# Patient Record
Sex: MALE | Race: WHITE | NOT HISPANIC OR LATINO | Employment: OTHER | URBAN - NONMETROPOLITAN AREA
[De-identification: names, ages, dates, MRNs, and addresses within clinical notes are randomized per-mention and may not be internally consistent; named-entity substitution may affect disease eponyms.]

---

## 2017-07-31 ENCOUNTER — OFFICE VISIT (OUTPATIENT)
Dept: FAMILY MEDICINE | Facility: OTHER | Age: 47
End: 2017-07-31

## 2017-07-31 ENCOUNTER — RADIANT APPOINTMENT (OUTPATIENT)
Dept: GENERAL RADIOLOGY | Facility: OTHER | Age: 47
End: 2017-07-31
Attending: PHYSICIAN ASSISTANT

## 2017-07-31 VITALS
HEIGHT: 71 IN | TEMPERATURE: 97.4 F | SYSTOLIC BLOOD PRESSURE: 110 MMHG | RESPIRATION RATE: 16 BRPM | DIASTOLIC BLOOD PRESSURE: 72 MMHG | HEART RATE: 72 BPM | WEIGHT: 193.2 LBS | BODY MASS INDEX: 27.05 KG/M2

## 2017-07-31 DIAGNOSIS — R10.2 PELVIC PAIN IN MALE: Primary | ICD-10-CM

## 2017-07-31 DIAGNOSIS — E78.5 HYPERLIPIDEMIA LDL GOAL <130: ICD-10-CM

## 2017-07-31 DIAGNOSIS — R10.2 PELVIC PAIN IN MALE: ICD-10-CM

## 2017-07-31 PROCEDURE — 99213 OFFICE O/P EST LOW 20 MIN: CPT | Performed by: PHYSICIAN ASSISTANT

## 2017-07-31 PROCEDURE — 72170 X-RAY EXAM OF PELVIS: CPT

## 2017-07-31 ASSESSMENT — PAIN SCALES - GENERAL: PAINLEVEL: EXTREME PAIN (8)

## 2017-07-31 NOTE — PROGRESS NOTES
SUBJECTIVE:                                                    Eric Wilson is a 46 year old male who presents to clinic today for the following health issues:    Patient has no insurance.    Joint Pain    Onset: yesterday was struck to the left ischium (by his description) by a saddle horn while riding horse at a [place].    Description:   Location: pelvic pain  Character: Sharp    Intensity: severe    Progression of Symptoms: same    Accompanying Signs & Symptoms:  Other symptoms: none    History:   Previous similar pain: no       Precipitating factors:   Trauma or overuse: YES    Alleviating factors:  Improved by: rest/inactivity and crutches    Therapies Tried and outcome: crutches, ibuprofen; slight relief    Problem list and histories reviewed & adjusted, as indicated.  Additional history: as documented    Patient Active Problem List   Diagnosis     Contusion of hand     Closed fracture of hamate (unciform) bone of wrist     Foreign body in site on external eye     Atopic rhinitis     CARDIOVASCULAR SCREENING; LDL GOAL LESS THAN 160     Past Surgical History:   Procedure Laterality Date     HC OPEN RX CARPAL BONE FX,EACH BONE  01/24/07    Open reduction internal fixation, right hand     NO HISTORY OF SURGERY         Social History   Substance Use Topics     Smoking status: Former Smoker     Packs/day: 0.50     Smokeless tobacco: Current User     Types: Chew      Comment: quit in 2000     Alcohol use Yes      Comment: rare     Family History   Problem Relation Age of Onset     DIABETES Maternal Grandfather      Prostate Cancer Paternal Grandfather              Reviewed and updated as needed this visit by clinical staffTobacco  Allergies  Meds  Med Hx  Surg Hx  Fam Hx  Soc Hx      Reviewed and updated as needed this visit by Provider         ROS:  Constitutional, HEENT, cardiovascular, pulmonary, gi and gu systems are negative, except as otherwise noted.      OBJECTIVE:   /72 (BP Location: Left arm,  "Patient Position: Chair, Cuff Size: Adult Large)  Pulse 72  Temp 97.4  F (36.3  C) (Oral)  Resp 16  Ht 5' 11\" (1.803 m)  Wt 193 lb 3.2 oz (87.6 kg)  BMI 26.95 kg/m2  Body mass index is 26.95 kg/(m^2).  GENERAL: healthy, alert and no distress  MS: decreased range of motion due to pain making full exam difficult today.  Pain to the bilateral lower buttocks noted as well as the pelvic floor.  NEURO: Normal strength and tone, mentation intact and speech normal  PSYCH: mentation appears normal, affect normal/bright    Diagnostic Test Results:  X-ray: negative for pathology today to my eye.  It will be overread by radiology.      ASSESSMENT/PLAN:     1. Pelvic pain in male  Suspected traumatic muscle strain / sprain.  Ice / heat as needed, frequent ambulation as tolerated.  ROV 1 week if not improved for consideration of MRI.  - XR Pelvis 1/2 Views; Future    Wayne Hayes PA-C  [facility]    "

## 2017-07-31 NOTE — NURSING NOTE
"Chief Complaint   Patient presents with     Pelvic Pain     yesterday       Initial /72 (BP Location: Left arm, Patient Position: Chair, Cuff Size: Adult Large)  Pulse 72  Temp 97.4  F (36.3  C) (Oral)  Resp 16  Ht 5' 11\" (1.803 m)  Wt 193 lb 3.2 oz (87.6 kg)  BMI 26.95 kg/m2 Estimated body mass index is 26.95 kg/(m^2) as calculated from the following:    Height as of this encounter: 5' 11\" (1.803 m).    Weight as of this encounter: 193 lb 3.2 oz (87.6 kg).  Medication Reconciliation: complete     Lindsey GUDINO LPN      "

## 2017-07-31 NOTE — MR AVS SNAPSHOT
"              After Visit Summary   2017    Eric Wilson    MRN: [id number]           Patient Information     Date Of Birth          1970        Visit Information        Provider Department      2017 10:40 AM Wayne Sauceda PA-C [facility]        Today's Diagnoses     Pelvic pain in male    -  1    Hyperlipidemia LDL goal <130           Follow-ups after your visit        Who to contact     If you have questions or need follow up information about today's clinic visit or your schedule please contact [facility] directly at [phone].  Normal or non-critical lab and imaging results will be communicated to you by [employer], letter or phone within 4 business days after the clinic has received the results. If you do not hear from us within 7 days, please contact the clinic through [employer] or phone. If you have a critical or abnormal lab result, we will notify you by phone as soon as possible.  Submit refill requests through [employer] or call your pharmacy and they will forward the refill request to us. Please allow 3 business days for your refill to be completed.          Additional Information About Your Visit        MyChart Information     [employer] lets you send messages to your doctor, view your test results, renew your prescriptions, schedule appointments and more. To sign up, go to [email or web address] . Click on \"Log in\" on the left side of the screen, which will take you to the Welcome page. Then click on \"Sign up Now\" on the right side of the page.     You will be asked to enter the access code listed below, as well as some personal information. Please follow the directions to create your username and password.     Your access code is: [email or web address]  Expires: 10/29/2017 11:18 AM     Your access code will  in 90 days. If you need help or a new code, please call your [facility] or [phone].        Care EveryWhere ID     This is your Care EveryWhere ID. This " "could be used by other organizations to access your [place] medical records  [id number]        Your Vitals Were     Pulse Temperature Respirations Height BMI (Body Mass Index)       72 97.4  F (36.3  C) (Oral) 16 5' 11\" (1.803 m) 26.95 kg/m2        Blood Pressure from Last 3 Encounters:   07/31/17 110/72   11/23/12 133/85   11/12/10 141/83    Weight from Last 3 Encounters:   07/31/17 193 lb 3.2 oz (87.6 kg)   11/12/10 176 lb 4 oz (79.9 kg)   08/10/10 174 lb (78.9 kg)               Primary Care Provider Office Phone # Fax #    Sanjiv Carrasco [phone]       [place] [id number] [place] [address]        Equal Access to Services     [facility]: Hadii thad ruiz hadasho Soomaali, waaxda luqadaha, qaybta kaalmada adeegyada, waxay barbin hayedna ruffin . So [facility] [phone].    ATENCIÓN: Si habla español, tiene a hammer disposición servicios gratuitos de asistencia lingüística. Wale al [phone].    We comply with applicable federal civil rights laws and [place] laws. We do not discriminate on the basis of race, color, national origin, age, disability sex, sexual orientation or gender identity.            Thank you!     Thank you for choosing [facility]  for your care. Our goal is always to provide you with excellent care. Hearing back from our patients is one way we can continue to improve our services. Please take a few minutes to complete the written survey that you may receive in the mail after your visit with us. Thank you!             Your Updated Medication List - Protect others around you: Learn how to safely use, store and throw away your medicines at www.disposemymeds.org.          This list is accurate as of: 7/31/17 11:18 AM.  Always use your most recent med list.                   Brand Name Dispense Instructions for use Diagnosis    loratadine-pseudoePHEDrine  MG per 24 hr tablet    CLARITIN-D 24-hour    30 tablet    Take " daily PRN for allergies    Atopic rhinitis

## 2018-12-27 ENCOUNTER — OFFICE VISIT (OUTPATIENT)
Dept: FAMILY MEDICINE | Facility: OTHER | Age: 48
End: 2018-12-27
Payer: COMMERCIAL

## 2018-12-27 VITALS
RESPIRATION RATE: 16 BRPM | TEMPERATURE: 99.4 F | WEIGHT: 193 LBS | HEART RATE: 60 BPM | SYSTOLIC BLOOD PRESSURE: 116 MMHG | DIASTOLIC BLOOD PRESSURE: 74 MMHG | BODY MASS INDEX: 26.14 KG/M2 | HEIGHT: 72 IN

## 2018-12-27 DIAGNOSIS — M25.562 ACUTE PAIN OF LEFT KNEE: Primary | ICD-10-CM

## 2018-12-27 PROCEDURE — 99213 OFFICE O/P EST LOW 20 MIN: CPT | Performed by: PHYSICIAN ASSISTANT

## 2018-12-27 ASSESSMENT — PAIN SCALES - GENERAL: PAINLEVEL: NO PAIN (0)

## 2018-12-27 ASSESSMENT — MIFFLIN-ST. JEOR: SCORE: 1798.44

## 2018-12-27 NOTE — PROGRESS NOTES
SUBJECTIVE:   Eric Wilson is a 47 year old male who presents to clinic today for the following health issues:    Left knee pain  Patient presents today for evaluation of left knee pain. He states she was kneeling down on the carpet last night fixing the TV when he had sudden, sharp pain over the left knee cap. He reports there continues to be pain in the same area but only when touched. He reports dog even brushed by his knee last night and that gave him a sharp pain. No other acute injury. No change in activity. He denies redness, swelling or discoloration. Does not feel like knee is going to give out. He denies trouble with knee pain in the past. He did fracture his tib/fib on this side many years ago but no ongoing symptoms. Patient works as a . Does feel knees bent most of the time. No fevers or other joint pain.     Problem list and histories reviewed & adjusted, as indicated.  Additional history: as documented    Patient Active Problem List   Diagnosis     Hyperlipidemia LDL goal <130     Pelvic pain in male     Past Surgical History:   Procedure Laterality Date     HC OPEN RX CARPAL BONE FX,EACH BONE  01/24/07    Open reduction internal fixation, right hand     NO HISTORY OF SURGERY         Social History     Tobacco Use     Smoking status: Former Smoker     Packs/day: 0.50     Smokeless tobacco: Current User     Types: Chew     Tobacco comment: quit in 2000   Substance Use Topics     Alcohol use: Yes     Comment: rare     Family History   Problem Relation Age of Onset     Diabetes Maternal Grandfather      Prostate Cancer Paternal Grandfather          Current Outpatient Medications   Medication Sig Dispense Refill     loratadine-pseudoePHEDrine (CLARITIN-D 24-HOUR)  MG per tablet Take daily PRN for allergies 30 tablet 1     Allergies   Allergen Reactions     Aspirin      Penicillins      BP Readings from Last 3 Encounters:   12/27/18 116/74   07/31/17 110/72   11/23/12 133/85    Wt  Readings from Last 3 Encounters:   12/27/18 87.5 kg (193 lb)   07/31/17 87.6 kg (193 lb 3.2 oz)   11/12/10 79.9 kg (176 lb 4 oz)         Reviewed and updated as needed this visit by clinical staff  Tobacco  Allergies  Meds  Med Hx  Surg Hx  Fam Hx  Soc Hx      Reviewed and updated as needed this visit by Provider       ROS:  Constitutional, HEENT, cardiovascular, pulmonary, gi and gu systems are negative, except as otherwise noted.    OBJECTIVE:     /74   Pulse 60   Temp 99.4  F (37.4  C) (Temporal)   Resp 16   Ht 1.829 m (6')   Wt 87.5 kg (193 lb)   BMI 26.18 kg/m    Body mass index is 26.18 kg/m .  GENERAL: healthy, alert and no distress  MS: left knee with no obvious deformity. No erythema, edema or ecchymosis. Tenderness over anterior patella. Mild crepitus appreciated. Full ROM of knee without pain. No joint line tenderness. Ligaments intact.   SKIN: no suspicious lesions or rashes  PSYCH: mentation appears normal, affect normal/bright    Diagnostic Test Results:  none     ASSESSMENT/PLAN:     1. Acute pain of left knee  Suspect symptoms likely related to mild bursitis. Encouraged supportive cares with rest, elevation, ice and tylenol/ibuprofen as needed. Discussed limiting time on knees and to extend leg when driving rather than keeping bent more than 90 degrees. Discussed signs of infection to be aware of. Patient will call if these develop. Follow-up pending progression of symptoms.     The patient indicates understanding of these issues and agrees with the plan.    Nette Lowery PA-C  [facility]

## 2019-04-28 ENCOUNTER — HOSPITAL ENCOUNTER (EMERGENCY)
Facility: CLINIC | Age: 49
Discharge: HOME | End: 2019-04-28
Attending: EMERGENCY MEDICINE | Admitting: EMERGENCY MEDICINE
Payer: COMMERCIAL

## 2019-04-28 VITALS
TEMPERATURE: 98.5 F | OXYGEN SATURATION: 96 % | HEIGHT: 72 IN | SYSTOLIC BLOOD PRESSURE: 145 MMHG | WEIGHT: 194 LBS | DIASTOLIC BLOOD PRESSURE: 82 MMHG | HEART RATE: 88 BPM | BODY MASS INDEX: 26.28 KG/M2 | RESPIRATION RATE: 18 BRPM

## 2019-04-28 DIAGNOSIS — S01.81XA CHIN LACERATION, INITIAL ENCOUNTER: ICD-10-CM

## 2019-04-28 PROCEDURE — 12011 RPR F/E/E/N/L/M 2.5 CM/<: CPT | Performed by: EMERGENCY MEDICINE

## 2019-04-28 PROCEDURE — 99283 EMERGENCY DEPT VISIT LOW MDM: CPT | Mod: 25 | Performed by: EMERGENCY MEDICINE

## 2019-04-28 PROCEDURE — 90471 IMMUNIZATION ADMIN: CPT | Performed by: EMERGENCY MEDICINE

## 2019-04-28 PROCEDURE — 90715 TDAP VACCINE 7 YRS/> IM: CPT | Performed by: EMERGENCY MEDICINE

## 2019-04-28 PROCEDURE — 12011 RPR F/E/E/N/L/M 2.5 CM/<: CPT | Mod: Z6 | Performed by: EMERGENCY MEDICINE

## 2019-04-28 PROCEDURE — 25000128 H RX IP 250 OP 636: Performed by: EMERGENCY MEDICINE

## 2019-04-28 PROCEDURE — 25000125 ZZHC RX 250: Performed by: EMERGENCY MEDICINE

## 2019-04-28 RX ORDER — LIDOCAINE HYDROCHLORIDE AND EPINEPHRINE 10; 10 MG/ML; UG/ML
1 INJECTION, SOLUTION INFILTRATION; PERINEURAL ONCE
Status: COMPLETED | OUTPATIENT
Start: 2019-04-28 | End: 2019-04-28

## 2019-04-28 RX ADMIN — LIDOCAINE HYDROCHLORIDE,EPINEPHRINE BITARTRATE 1 ML: 10; .01 INJECTION, SOLUTION INFILTRATION; PERINEURAL at 17:45

## 2019-04-28 RX ADMIN — CLOSTRIDIUM TETANI TOXOID ANTIGEN (FORMALDEHYDE INACTIVATED), CORYNEBACTERIUM DIPHTHERIAE TOXOID ANTIGEN (FORMALDEHYDE INACTIVATED), BORDETELLA PERTUSSIS TOXOID ANTIGEN (GLUTARALDEHYDE INACTIVATED), BORDETELLA PERTUSSIS FILAMENTOUS HEMAGGLUTININ ANTIGEN (FORMALDEHYDE INACTIVATED), BORDETELLA PERTUSSIS PERTACTIN ANTIGEN, AND BORDETELLA PERTUSSIS FIMBRIAE 2/3 ANTIGEN 0.5 ML: 5; 2; 2.5; 5; 3; 5 INJECTION, SUSPENSION INTRAMUSCULAR at 18:07

## 2019-04-28 ASSESSMENT — MIFFLIN-ST. JEOR: SCORE: 1797.98

## 2019-04-28 NOTE — ED PROVIDER NOTES
History     Chief Complaint   Patient presents with     Facial Laceration     HPI  Eric Wilson is a 48 year old male who presents with a chin laceration.  He reports falling going up the stairs about 3 hours ago.  No dental or other injury.  Tetanus unknown.    Allergies:  Allergies   Allergen Reactions     Aspirin      Penicillins        Problem List:    Patient Active Problem List    Diagnosis Date Noted     Hyperlipidemia LDL goal <130 07/31/2017     Priority: Medium     Pelvic pain in male 07/31/2017     Priority: Medium        Past Medical History:    Past Medical History:   Diagnosis Date     Atopic rhinitis 10/22/2009     Closed fracture of hamate (unciform) bone of wrist 1/19/2007     Contusion of hand 1/1/2007     Foreign body in site on external eye 4/21/2007     NO ACTIVE PROBLEMS        Past Surgical History:    Past Surgical History:   Procedure Laterality Date     HC OPEN RX CARPAL BONE FX,EACH BONE  01/24/07    Open reduction internal fixation, right hand     NO HISTORY OF SURGERY         Family History:    Family History   Problem Relation Age of Onset     Diabetes Maternal Grandfather      Prostate Cancer Paternal Grandfather        Social History:  Marital Status:   [2]  Social History     Tobacco Use     Smoking status: Former Smoker     Packs/day: 0.50     Smokeless tobacco: Current User     Types: Chew     Tobacco comment: quit in 2000   Substance Use Topics     Alcohol use: Yes     Comment: rare     Drug use: No        Medications:      loratadine-pseudoePHEDrine (CLARITIN-D 24-HOUR)  MG per tablet         Review of Systems  All other systems are reviewed and are negative    Physical Exam   BP: 145/82  Pulse: 88  Temp: 98.5  F (36.9  C)  Resp: 18  Height: 182.9 cm (6')  Weight: 88 kg (194 lb)  SpO2: 96 %      Physical Exam   Constitutional: No distress.   HENT:   Head: Normocephalic and atraumatic.   1.5 cm laceration over the right anterior shin.  This is into the subcutaneous  tissue.  No foreign body seen.  Child moves well.  No dental injury obvious.   Eyes: Conjunctivae are normal. Right eye exhibits no discharge. Left eye exhibits no discharge. No scleral icterus.   Neck: Normal range of motion.   Pulmonary/Chest: Effort normal. No stridor.   Musculoskeletal: Normal range of motion.   Neurological: He is alert.   Normal speech and mentation   Skin: Skin is warm and dry. No rash noted. He is not diaphoretic.   Psychiatric: Judgment normal.   Vitals reviewed.      ED Course        Laceration repair  Date/Time: 4/28/2019 6:02 PM  Performed by: Héctor Payan MD  Authorized by: Héctor Payan MD   Body area: head/neck  Location details: chin  Laceration length: 1.5 cm  Foreign bodies: no foreign bodies  Anesthesia: local infiltration    Anesthesia:  Local Anesthetic: lidocaine 1% with epinephrine  Anesthetic total: 4 mL  Irrigation solution: saline  Amount of cleaning: standard  Skin closure: Ethilon  Number of sutures: 4  Technique: simple  Approximation: close  Approximation difficulty: simple  Patient tolerance: Patient tolerated the procedure well with no immediate complications                     Critical Care time:  none               No results found for this or any previous visit (from the past 24 hour(s)).    Medications   Tdap (tetanus-diphtheria-acell pertussis) (ADACEL) injection 0.5 mL (has no administration in time range)   lidocaine 1% with EPINEPHrine 1:100,000 injection 1 mL (1 mL Intradermal Given 4/28/19 1745)       Assessments & Plan (with Medical Decision Making)  48-year-old male with chin laceration.  Sutured as above.  Tetanus updated.  Sutures out in 5 days.     I have reviewed the nursing notes.    I have reviewed the findings, diagnosis, plan and need for follow up with the patient.          Medication List      There are no discharge medications for this visit.         Final diagnoses:   Chin laceration, initial encounter       4/28/2019   [place]  [facility] EMERGENCY DEPARTMENT     Héctor Payan MD  04/28/19 9718

## 2019-04-28 NOTE — ED AVS SNAPSHOT
[facility] Emergency Department  [address] [place] [address]  Phone:  [phone]  Fax:  [phone]                                    Eric Wilson   MRN: [id number]    Department:  [facility] Emergency Department   Date of Visit:  4/28/2019           After Visit Summary Signature Page    I have received my discharge instructions, and my questions have been answered. I have discussed any challenges I see with this plan with the nurse or doctor.    ..........................................................................................................................................  Patient/Patient Representative Signature      ..........................................................................................................................................  Patient Representative Print Name and Relationship to Patient    ..................................................               ................................................  Date                                   Time    ..........................................................................................................................................  Reviewed by Signature/Title    ...................................................              ..............................................  Date                                               Time          22EPIC Rev 08/18

## 2020-07-19 ENCOUNTER — APPOINTMENT (OUTPATIENT)
Dept: GENERAL RADIOLOGY | Facility: CLINIC | Age: 50
End: 2020-07-19
Attending: NURSE PRACTITIONER
Payer: COMMERCIAL

## 2020-07-19 ENCOUNTER — HOSPITAL ENCOUNTER (EMERGENCY)
Facility: CLINIC | Age: 50
Discharge: HOME | End: 2020-07-19
Attending: NURSE PRACTITIONER | Admitting: NURSE PRACTITIONER
Payer: COMMERCIAL

## 2020-07-19 VITALS
HEART RATE: 70 BPM | TEMPERATURE: 98 F | OXYGEN SATURATION: 96 % | SYSTOLIC BLOOD PRESSURE: 135 MMHG | DIASTOLIC BLOOD PRESSURE: 80 MMHG | RESPIRATION RATE: 16 BRPM

## 2020-07-19 DIAGNOSIS — F10.930 ALCOHOL WITHDRAWAL SYNDROME WITHOUT COMPLICATION (H): ICD-10-CM

## 2020-07-19 DIAGNOSIS — E86.0 DEHYDRATION: ICD-10-CM

## 2020-07-19 LAB
ALBUMIN SERPL-MCNC: 4.3 G/DL (ref 3.4–5)
ALP SERPL-CCNC: 88 U/L (ref 40–150)
ALT SERPL W P-5'-P-CCNC: 22 U/L (ref 0–70)
ANION GAP SERPL CALCULATED.3IONS-SCNC: 7 MMOL/L (ref 3–14)
AST SERPL W P-5'-P-CCNC: 21 U/L (ref 0–45)
BASOPHILS # BLD AUTO: 0 10E9/L (ref 0–0.2)
BASOPHILS NFR BLD AUTO: 0.4 %
BILIRUB SERPL-MCNC: 0.7 MG/DL (ref 0.2–1.3)
BUN SERPL-MCNC: 9 MG/DL (ref 7–30)
CALCIUM SERPL-MCNC: 8.7 MG/DL (ref 8.5–10.1)
CHLORIDE SERPL-SCNC: 107 MMOL/L (ref 94–109)
CO2 SERPL-SCNC: 24 MMOL/L (ref 20–32)
CREAT SERPL-MCNC: 0.96 MG/DL (ref 0.66–1.25)
D DIMER PPP FEU-MCNC: 0.3 UG/ML FEU (ref 0–0.5)
DIFFERENTIAL METHOD BLD: NORMAL
EOSINOPHIL NFR BLD AUTO: 1 %
ERYTHROCYTE [DISTWIDTH] IN BLOOD BY AUTOMATED COUNT: 12.6 % (ref 10–15)
GFR SERPL CREATININE-BSD FRML MDRD: >90 ML/MIN/{1.73_M2}
GLUCOSE SERPL-MCNC: 101 MG/DL (ref 70–99)
HCT VFR BLD AUTO: 45.3 % (ref 40–53)
HGB BLD-MCNC: 15.9 G/DL (ref 13.3–17.7)
IMM GRANULOCYTES # BLD: 0 10E9/L (ref 0–0.4)
IMM GRANULOCYTES NFR BLD: 0.4 %
LACTATE BLD-SCNC: 1.3 MMOL/L (ref 0.7–2)
LYMPHOCYTES # BLD AUTO: 1.4 10E9/L (ref 0.8–5.3)
LYMPHOCYTES NFR BLD AUTO: 15.1 %
MCH RBC QN AUTO: 29.8 PG (ref 26.5–33)
MCHC RBC AUTO-ENTMCNC: 35.1 G/DL (ref 31.5–36.5)
MCV RBC AUTO: 85 FL (ref 78–100)
MONOCYTES # BLD AUTO: 0.6 10E9/L (ref 0–1.3)
MONOCYTES NFR BLD AUTO: 6.3 %
NEUTROPHILS # BLD AUTO: 6.9 10E9/L (ref 1.6–8.3)
NEUTROPHILS NFR BLD AUTO: 76.8 %
NRBC # BLD AUTO: 0 10*3/UL
NRBC BLD AUTO-RTO: 0 /100
PLATELET # BLD AUTO: 279 10E9/L (ref 150–450)
POTASSIUM SERPL-SCNC: 4 MMOL/L (ref 3.4–5.3)
PROT SERPL-MCNC: 7.5 G/DL (ref 6.8–8.8)
RBC # BLD AUTO: 5.34 10E12/L (ref 4.4–5.9)
SODIUM SERPL-SCNC: 138 MMOL/L (ref 133–144)
TROPONIN I SERPL-MCNC: <0.015 UG/L (ref 0–0.04)
WBC # BLD AUTO: 9 10E9/L (ref 4–11)

## 2020-07-19 PROCEDURE — 85379 FIBRIN DEGRADATION QUANT: CPT | Performed by: NURSE PRACTITIONER

## 2020-07-19 PROCEDURE — 96375 TX/PRO/DX INJ NEW DRUG ADDON: CPT | Performed by: NURSE PRACTITIONER

## 2020-07-19 PROCEDURE — 71046 X-RAY EXAM CHEST 2 VIEWS: CPT | Mod: TC

## 2020-07-19 PROCEDURE — 25000128 H RX IP 250 OP 636: Performed by: NURSE PRACTITIONER

## 2020-07-19 PROCEDURE — 85025 COMPLETE CBC W/AUTO DIFF WBC: CPT | Performed by: NURSE PRACTITIONER

## 2020-07-19 PROCEDURE — 96374 THER/PROPH/DIAG INJ IV PUSH: CPT | Performed by: NURSE PRACTITIONER

## 2020-07-19 PROCEDURE — 84484 ASSAY OF TROPONIN QUANT: CPT | Performed by: NURSE PRACTITIONER

## 2020-07-19 PROCEDURE — 93005 ELECTROCARDIOGRAM TRACING: CPT | Performed by: NURSE PRACTITIONER

## 2020-07-19 PROCEDURE — 83605 ASSAY OF LACTIC ACID: CPT | Performed by: NURSE PRACTITIONER

## 2020-07-19 PROCEDURE — 99285 EMERGENCY DEPT VISIT HI MDM: CPT | Mod: 25 | Performed by: NURSE PRACTITIONER

## 2020-07-19 PROCEDURE — 93010 ELECTROCARDIOGRAM REPORT: CPT | Mod: Z6 | Performed by: NURSE PRACTITIONER

## 2020-07-19 PROCEDURE — 96361 HYDRATE IV INFUSION ADD-ON: CPT | Performed by: NURSE PRACTITIONER

## 2020-07-19 PROCEDURE — 80053 COMPREHEN METABOLIC PANEL: CPT | Performed by: NURSE PRACTITIONER

## 2020-07-19 PROCEDURE — 25800030 ZZH RX IP 258 OP 636: Performed by: NURSE PRACTITIONER

## 2020-07-19 RX ORDER — DIPHENHYDRAMINE HYDROCHLORIDE 50 MG/ML
25 INJECTION INTRAMUSCULAR; INTRAVENOUS ONCE
Status: COMPLETED | OUTPATIENT
Start: 2020-07-19 | End: 2020-07-19

## 2020-07-19 RX ORDER — LORAZEPAM 2 MG/ML
1 INJECTION INTRAMUSCULAR ONCE
Status: COMPLETED | OUTPATIENT
Start: 2020-07-19 | End: 2020-07-19

## 2020-07-19 RX ADMIN — LORAZEPAM 1 MG: 2 INJECTION INTRAMUSCULAR; INTRAVENOUS at 13:31

## 2020-07-19 RX ADMIN — DIPHENHYDRAMINE HYDROCHLORIDE 25 MG: 50 INJECTION, SOLUTION INTRAMUSCULAR; INTRAVENOUS at 13:29

## 2020-07-19 RX ADMIN — SODIUM CHLORIDE 1000 ML: 9 INJECTION, SOLUTION INTRAVENOUS at 13:28

## 2020-07-19 NOTE — ED TRIAGE NOTES
Presents feeling short of air. States he feels like he has a post nasal drip. States he drank a lot of alcohol last night and started to feel shakey and short of breath PTA.

## 2020-07-19 NOTE — ED AVS SNAPSHOT
[facility] Emergency Department  [address] [place] [address]  Phone:  [phone]  Fax:  [phone]                                    Eric Wilson   MRN: [id number]    Department:  [facility] Emergency Department   Date of Visit:  7/19/2020           After Visit Summary Signature Page    I have received my discharge instructions, and my questions have been answered. I have discussed any challenges I see with this plan with the nurse or doctor.    ..........................................................................................................................................  Patient/Patient Representative Signature      ..........................................................................................................................................  Patient Representative Print Name and Relationship to Patient    ..................................................               ................................................  Date                                   Time    ..........................................................................................................................................  Reviewed by Signature/Title    ...................................................              ..............................................  Date                                               Time          22EPIC Rev 08/18

## 2020-07-19 NOTE — DISCHARGE INSTRUCTIONS
Eric, your blood work and chest x-ray are reassuring today, I think your symptoms were largely related to mild alcohol withdrawal in combination with dehydration.  I would recommend staying very well-hydrated for the rest of today, avoid any alcohol use in the near future.  There is no evidence of swelling in your neck or chest so this is likely a symptom of your environmental allergies and I would recommend a Zyrtec or Claritin daily for the next few days.  Try to get some rest, if you have any worsening symptoms or new concerns don't hesitate to return to the emergency room.  It was nice meeting you and I am glad you are feeling better.

## 2020-07-19 NOTE — ED PROVIDER NOTES
History     Chief Complaint   Patient presents with     Shortness of Breath     Anxiety     HPI  Eric Wilson is a 49 year old male who presents to the emergency room today with sudden onset of shortness of breath while sitting down to eat lunch.  Patient denies any history of shortness of breath to this degree.  Patient arrives here tremulous, anxious, denies any history of anxiety.  Patient denies any recent fever, cough or cold symptoms other than some postnasal drip which he attributes to his environmental allergies.  Patient has not taken anything for his symptoms.  Nothing seems to make his symptoms better or worse at this time.  Patient does report that he did drink a 12 pack of beer last night, last drink was around midnight, he does not normally drink more than a couple of beers a week.  Patient denies any significant cardiac history, recent travel or sedentary history.    Allergies:  Allergies   Allergen Reactions     Aspirin      Penicillins        Problem List:    Patient Active Problem List    Diagnosis Date Noted     Hyperlipidemia LDL goal <130 07/31/2017     Priority: Medium     Pelvic pain in male 07/31/2017     Priority: Medium        Past Medical History:    Past Medical History:   Diagnosis Date     Atopic rhinitis 10/22/2009     Closed fracture of hamate (unciform) bone of wrist 1/19/2007     Contusion of hand 1/1/2007     Foreign body in site on external eye 4/21/2007     NO ACTIVE PROBLEMS        Past Surgical History:    Past Surgical History:   Procedure Laterality Date     HC OPEN RX CARPAL BONE FX,EACH BONE  01/24/07    Open reduction internal fixation, right hand     NO HISTORY OF SURGERY         Family History:    Family History   Problem Relation Age of Onset     Diabetes Maternal Grandfather      Prostate Cancer Paternal Grandfather        Social History:  Marital Status:   [2]  Social History     Tobacco Use     Smoking status: Former Smoker     Packs/day: 0.50     Smokeless  tobacco: Current User     Types: Chew     Tobacco comment: quit in 2000   Substance Use Topics     Alcohol use: Yes     Comment: rare     Drug use: No        Medications:    loratadine-pseudoePHEDrine (CLARITIN-D 24-HOUR)  MG per tablet          Review of Systems   All other systems reviewed and are negative.      Physical Exam   BP: (!) 140/92  Heart Rate: 62  Temp: 98  F (36.7  C)  Resp: 16  SpO2: 99 %      Physical Exam  Constitutional:       Comments: Pleasant, very anxious appearing male sitting on the bed in no acute distress   HENT:      Head: Normocephalic.      Nose: Nose normal.      Mouth/Throat:      Mouth: Mucous membranes are moist.      Pharynx: No oropharyngeal exudate or posterior oropharyngeal erythema.      Comments: No evidence of intraoral swelling  Eyes:      Extraocular Movements: Extraocular movements intact.   Neck:      Musculoskeletal: Normal range of motion and neck supple.   Cardiovascular:      Rate and Rhythm: Normal rate.      Pulses: Normal pulses.   Musculoskeletal: Normal range of motion.   Skin:     General: Skin is warm.      Capillary Refill: Capillary refill takes less than 2 seconds.   Neurological:      General: No focal deficit present.      Mental Status: He is alert. Mental status is at baseline.      Comments: Tremulous   Psychiatric:      Comments: Very anxious         ED Course        Procedures         EKG Interpretation:      Interpreted by AYDIN Larsen CNP  Time reviewed: 1400  Symptoms at time of EKG: Anxious, SOB   Rhythm: Normal sinus   Rate: 64  Axis: Normal  Ectopy: None  Conduction: Normal  ST Segments/ T Waves: No ST-T wave changes and No acute ischemic changes  Q Waves: None  Clinical Impression: normal EKG    Results for orders placed or performed during the hospital encounter of 07/19/20 (from the past 24 hour(s))   CBC with platelets differential   Result Value Ref Range    WBC 9.0 4.0 - 11.0 10e9/L    RBC Count 5.34 4.4 - 5.9 10e12/L     Hemoglobin 15.9 13.3 - 17.7 g/dL    Hematocrit 45.3 40.0 - 53.0 %    MCV 85 78 - 100 fl    MCH 29.8 26.5 - 33.0 pg    MCHC 35.1 31.5 - 36.5 g/dL    RDW 12.6 10.0 - 15.0 %    Platelet Count 279 150 - 450 10e9/L    Diff Method Automated Method     % Neutrophils 76.8 %    % Lymphocytes 15.1 %    % Monocytes 6.3 %    % Eosinophils 1.0 %    % Basophils 0.4 %    % Immature Granulocytes 0.4 %    Nucleated RBCs 0 0 /100    Absolute Neutrophil 6.9 1.6 - 8.3 10e9/L    Absolute Lymphocytes 1.4 0.8 - 5.3 10e9/L    Absolute Monocytes 0.6 0.0 - 1.3 10e9/L    Absolute Basophils 0.0 0.0 - 0.2 10e9/L    Abs Immature Granulocytes 0.0 0 - 0.4 10e9/L    Absolute Nucleated RBC 0.0    Troponin I   Result Value Ref Range    Troponin I ES <0.015 0.000 - 0.045 ug/L   D dimer quantitative   Result Value Ref Range    D Dimer 0.3 0.0 - 0.50 ug/ml FEU   Lactic acid whole blood   Result Value Ref Range    Lactic Acid 1.3 0.7 - 2.0 mmol/L   Comprehensive metabolic panel   Result Value Ref Range    Sodium 138 133 - 144 mmol/L    Potassium 4.0 3.4 - 5.3 mmol/L    Chloride 107 94 - 109 mmol/L    Carbon Dioxide 24 20 - 32 mmol/L    Anion Gap 7 3 - 14 mmol/L    Glucose 101 (H) 70 - 99 mg/dL    Urea Nitrogen 9 7 - 30 mg/dL    Creatinine 0.96 0.66 - 1.25 mg/dL    GFR Estimate >90 >60 mL/min/[1.73_m2]    GFR Estimate If Black >90 >60 mL/min/[1.73_m2]    Calcium 8.7 8.5 - 10.1 mg/dL    Bilirubin Total 0.7 0.2 - 1.3 mg/dL    Albumin 4.3 3.4 - 5.0 g/dL    Protein Total 7.5 6.8 - 8.8 g/dL    Alkaline Phosphatase 88 40 - 150 U/L    ALT 22 0 - 70 U/L    AST 21 0 - 45 U/L   XR Chest 2 Views    Narrative    XR CHEST 2 VW   7/19/2020 2:33 PM     HISTORY: sob, ? Neck feels tight    COMPARISON: 10/27/2005      Impression    IMPRESSION: Minimal bibasilar subsegmental atelectasis. No pleural  effusion, pneumothorax or focal consolidation. Cardiac and mediastinal  silhouettes unremarkable.    VALERY ZHANG MD       Medications   0.9% sodium chloride BOLUS (1,000  mLs Intravenous New Bag 7/19/20 1328)   LORazepam (ATIVAN) injection 1 mg (1 mg Intravenous Given 7/19/20 1331)   diphenhydrAMINE (BENADRYL) injection 25 mg (25 mg Intravenous Given 7/19/20 1329)       Assessments & Plan (with Medical Decision Making)  Eric is a 49-year-old male, otherwise healthy, presents to the emergency room with complaints of shortness of breath, postnasal drip, please refer to HPI and focused exam.  Patient on arrival has symptoms consistent with mild alcohol withdrawal, I think he is very anxious, he is tremulous.  Peripheral IV was started and patient was given IV Benadryl and Ativan and is feeling much better, pretty significantly shortly after medications were administered.  Blood work, EKG and chest x-ray are all reassuring today.  Patient has been able to eat food and keep fluids down here without any difficulty.  Patient does still complain of his neck feeling tight, there is no findings on exam that are concerning, no stridor, no wheezing, with a clear chest x-ray I feel patient is stable to be discharged home.  His sensation may be related to environmental allergies so I did recommend Claritin or Zyrtec daily for the next few days.  Avoid any alcohol use in the near future.  Stay well-hydrated and get plenty of rest.  Reasons to return to the emergency room were discussed.  Patient is agreeable to plan of care and was discharged in stable condition with a .     I have reviewed the nursing notes.    I have reviewed the findings, diagnosis, plan and need for follow up with the patient.    New Prescriptions    No medications on file       Final diagnoses:   Alcohol withdrawal syndrome without complication (H)   Dehydration       7/19/2020   [facility] EMERGENCY DEPARTMENT     Sherrie Payan, AYDIN CNP  07/19/20 1300

## 2021-09-13 ENCOUNTER — OFFICE VISIT (OUTPATIENT)
Dept: OTOLARYNGOLOGY | Facility: CLINIC | Age: 51
End: 2021-09-13
Payer: COMMERCIAL

## 2021-09-13 VITALS
SYSTOLIC BLOOD PRESSURE: 108 MMHG | DIASTOLIC BLOOD PRESSURE: 62 MMHG | BODY MASS INDEX: 26.29 KG/M2 | TEMPERATURE: 96.8 F | WEIGHT: 194.1 LBS | HEIGHT: 72 IN

## 2021-09-13 DIAGNOSIS — J30.9 ALLERGIC RHINITIS, UNSPECIFIED SEASONALITY, UNSPECIFIED TRIGGER: Primary | ICD-10-CM

## 2021-09-13 DIAGNOSIS — J31.2 CHRONIC PHARYNGITIS: ICD-10-CM

## 2021-09-13 PROCEDURE — 99203 OFFICE O/P NEW LOW 30 MIN: CPT | Performed by: OTOLARYNGOLOGY

## 2021-09-13 RX ORDER — FLUTICASONE PROPIONATE 50 MCG
2 SPRAY, SUSPENSION (ML) NASAL DAILY
Qty: 16 G | Refills: 3 | Status: SHIPPED | OUTPATIENT
Start: 2021-09-13 | End: 2021-10-13

## 2021-09-13 ASSESSMENT — MIFFLIN-ST. JEOR: SCORE: 1788.43

## 2021-09-13 NOTE — PROGRESS NOTES
"ENT Consultation    Eric Wilson who is a 50 year old male seen in consultation at the request of self.      History of Present Illness - Eirc Wilson is a 50 year old male presents for evaluation of some swallowing issues.  Started with 6 months ago.  What he feels some thickening in the back of his mouth his throat and takes longer for food to go down mostly solids.  He attributes it to a lot of postnasal discharge a lot of phlegm that he feels in the back of his throat.  Some discomfort sometimes when he swallows.  Denies any halitosis denies any tonsil stones or any tonsil issues in the past.  He does have known allergies in about 10 years ago was diagnosed with a number of allergies seasonal perennial but most severe to dust.  He never received any allergy shots.  Takes Claritin-D as he needs to which seems to help some.  Has not tried any nasal steroid sprays.  Patient was a smoker but quit 15 years ago but still chews tobacco.  He also has \"dull ache\" in the back of his throat from time to time.  Denies any fever or chills.  He does have some difficulty with nasal breathing especially on the right side.  Denies any no history of nasal fracture but has had nasal trauma in the past.  Sense of smell appears to be somewhat diminished but sense of taste is intact.  Patient denies any symptoms of acid reflux or LPR D.  He denies any globus sensation or significant voice related changes.    Body mass index is 26.32 kg/m .        BP Readings from Last 1 Encounters:   09/13/21 108/62       BP noted to be well controlled today in office.     Eric IS no longer a smoker but still uses chewing tobacco.  Eric is trying to quit      Past Medical History -   Past Medical History:   Diagnosis Date     Atopic rhinitis 10/22/2009     (Problem list name updated by automated process. Provider to review and confirm.)     Closed fracture of hamate (unciform) bone of wrist 1/19/2007     Contusion of hand 1/1/2007     Problem list " name updated by automated process. Provider to review     Foreign body in site on external eye 4/21/2007     Problem list name updated by automated process. Provider to review     NO ACTIVE PROBLEMS        Current Medications -   Current Outpatient Medications:      loratadine-pseudoePHEDrine (CLARITIN-D 24-HOUR)  MG per tablet, Take daily PRN for allergies, Disp: 30 tablet, Rfl: 1    Allergies -   Allergies   Allergen Reactions     Aspirin      Penicillins        Social History -   Social History     Socioeconomic History     Marital status:      Spouse name: Yasmine     Number of children: 2     Years of education: Not on file     Highest education level: Not on file   Occupational History     Occupation:      Employer: SELF EMPLOYED     Employer: SELF   Tobacco Use     Smoking status: Former Smoker     Packs/day: 0.50     Smokeless tobacco: Current User     Types: Chew     Tobacco comment: quit in 2000   Substance and Sexual Activity     Alcohol use: Yes     Comment: rare     Drug use: No     Sexual activity: Yes   Other Topics Concern      Service No     Blood Transfusions No     Caffeine Concern No     Occupational Exposure Not Asked     Hobby Hazards Yes     Comment: scuba diving     Sleep Concern No     Stress Concern Yes     Weight Concern No     Special Diet Not Asked     Back Care Not Asked     Exercise Not Asked     Bike Helmet Not Asked     Seat Belt No     Self-Exams Not Asked     Parent/sibling w/ CABG, MI or angioplasty before 65F 55M? Not Asked   Social History Narrative     Not on file     Social Determinants of Health     Financial Resource Strain:      Difficulty of Paying Living Expenses:    Food Insecurity:      Worried About Running Out of Food in the Last Year:      Ran Out of Food in the Last Year:    Transportation Needs:      Lack of Transportation (Medical):      Lack of Transportation (Non-Medical):    Physical Activity:      Days of Exercise per Week:       Minutes of Exercise per Session:    Stress:      Feeling of Stress :    Social Connections:      Frequency of Communication with Friends and Family:      Frequency of Social Gatherings with Friends and Family:      Attends Hinduism Services:      Active Member of Clubs or Organizations:      Attends Club or Organization Meetings:      Marital Status:    Intimate Partner Violence:      Fear of Current or Ex-Partner:      Emotionally Abused:      Physically Abused:      Sexually Abused:        Family History -   Family History   Problem Relation Age of Onset     Diabetes Maternal Grandfather      Prostate Cancer Paternal Grandfather        Review of Systems - As per HPI and PMHx, otherwise review of system review of the head and neck negative. Otherwise 10+ review of system is negative    Physical Exam  /62 (BP Location: Right arm, Patient Position: Sitting, Cuff Size: Adult Regular)   Temp 96.8  F (36  C) (Temporal)   Ht 1.829 m (6')   Wt 88 kg (194 lb 1.6 oz)   BMI 26.32 kg/m    BMI: Body mass index is 26.32 kg/m .    General - The patient is well nourished and well developed, and appears to have good nutritional status.  Alert and oriented to person and place, answers questions and cooperates with examination appropriately.    SKIN - No suspicious lesions or rashes.  Respiration - No respiratory distress.  Head and Face - Normocephalic and atraumatic, with no gross asymmetry noted of the contour of the facial features.  The facial nerve is intact, with strong symmetric movements.    Voice and Breathing - The patient was breathing comfortably without the use of accessory muscles. The patients voice was clear and strong, and had appropriate pitch and quality.    Ears - Bilateral pinna and EACs with normal appearing overlying skin. Tympanic membrane intact with good mobility on pneumatic otoscopy bilaterally. Bony landmarks of the ossicular chain are normal. The tympanic membranes are normal in appearance.  No retraction, perforation, or masses.  No fluid or purulence was seen in the external canal or the middle ear.     Eyes - Extraocular movements intact.  Sclera were not icteric or injected, conjunctiva were pink and moist.    Mouth - Examination of the oral cavity showed pink, healthy oral mucosa. No lesions or ulcerations noted.  The tongue was mobile and midline, and the dentition were in good condition.  I can visualize the area of where he keeps his chewing tobacco and there appears to be no evidence of ulceration or inflammation visually that area of gingivolabial sulcus anteriorly.    Throat - The walls of the oropharynx were smooth, pink, moist, symmetric, and had no lesions or ulcerations.  The tonsillar pillars and soft palate were symmetric.  Tonsils appear to be a 2-3+ in size with multiple crypts tonsil stones erythema thick secretions around them some thick secretions in the posterior pharyngeal mucosa.  No cobblestoning.  The uvula was midline on elevation.    Neck - Normal midline excursion of the laryngotracheal complex during swallowing.  Full range of motion on passive movement.  Palpation of the occipital, submental, submandibular, internal jugular chain, and supraclavicular nodes did not demonstrate any abnormal lymph nodes or masses.  The carotid pulse was palpable bilaterally.  Palpation of the thyroid was soft and smooth, with no nodules or goiter appreciated.  The trachea was mobile and midline.    Nose - External contour is symmetric, no gross deflection or scars.  Nasal mucosa is pink and moist with no abnormal mucus.  The septum was deviated to the right and somewhat obstructive, turbinates of normal size and position.  No polyps, masses, or purulence noted on examination.    Neuro - Nonfocal neuro exam is normal, CN 2 through 12 intact, normal gait and muscle tone.      Performed in clinic today:  No procedures preformed in clinic today      A/P - Eric Wilson is a 50 year old male with  nonspecific posterior pharyngitis tonsillitis in the last 6 months with a lot of phlegm giving him a feeling of thickening and possibly pharyngeal dysphagia.  Also appears to have history of allergic rhinitis with postnasal drainage that is often copious.  At this point will start conservative treatment with fluticasone to dry up postnasal secretions also compounded Magic mouthwash consisting of Benadryl Decadron and Maalox to dry up anti-inflammatory mucosa reduce inflammation locally.  Considering he also consumes fair amount of caffeinated drinks we urged him to stop those and replace them with water to better hydrate himself especially that topical medications prescribed will dehydrate him further.  Patient will recheck in 6 weeks to see how he has done and will recheck on his symptoms.  We strongly urged him to consider quitting chewing tobacco as soon as he can.    Merritt Yusuf MD

## 2021-09-13 NOTE — LETTER
"    9/13/2021         RE: Eric Wilson  [address]  [facility] [address]        Dear Colleague,    Thank you for referring your patient, Eric Wilson, to the [facility]. Please see a copy of my visit note below.    ENT Consultation    Eric Wilson who is a 50 year old male seen in consultation at the request of self.      History of Present Illness - Eric Wilson is a 50 year old male presents for evaluation of some swallowing issues.  Started with 6 months ago.  What he feels some thickening in the back of his mouth his throat and takes longer for food to go down mostly solids.  He attributes it to a lot of postnasal discharge a lot of phlegm that he feels in the back of his throat.  Some discomfort sometimes when he swallows.  Denies any halitosis denies any tonsil stones or any tonsil issues in the past.  He does have known allergies in about 10 years ago was diagnosed with a number of allergies seasonal perennial but most severe to dust.  He never received any allergy shots.  Takes Claritin-D as he needs to which seems to help some.  Has not tried any nasal steroid sprays.  Patient was a smoker but quit 15 years ago but still chews tobacco.  He also has \"dull ache\" in the back of his throat from time to time.  Denies any fever or chills.  He does have some difficulty with nasal breathing especially on the right side.  Denies any no history of nasal fracture but has had nasal trauma in the past.  Sense of smell appears to be somewhat diminished but sense of taste is intact.  Patient denies any symptoms of acid reflux or LPR D.  He denies any globus sensation or significant voice related changes.    Body mass index is 26.32 kg/m .        BP Readings from Last 1 Encounters:   09/13/21 108/62       BP noted to be well controlled today in office.     Eric IS no longer a smoker but still uses chewing tobacco.  Eric is trying to quit      Past Medical History -   Past Medical History: "   Diagnosis Date     Atopic rhinitis 10/22/2009     (Problem list name updated by automated process. Provider to review and confirm.)     Closed fracture of hamate (unciform) bone of wrist 1/19/2007     Contusion of hand 1/1/2007     Problem list name updated by automated process. Provider to review     Foreign body in site on external eye 4/21/2007     Problem list name updated by automated process. Provider to review     NO ACTIVE PROBLEMS        Current Medications -   Current Outpatient Medications:      loratadine-pseudoePHEDrine (CLARITIN-D 24-HOUR)  MG per tablet, Take daily PRN for allergies, Disp: 30 tablet, Rfl: 1    Allergies -   Allergies   Allergen Reactions     Aspirin      Penicillins        Social History -   Social History     Socioeconomic History     Marital status:      Spouse name: Yasmine     Number of children: 2     Years of education: Not on file     Highest education level: Not on file   Occupational History     Occupation:      Employer: SELF EMPLOYED     Employer: SELF   Tobacco Use     Smoking status: Former Smoker     Packs/day: 0.50     Smokeless tobacco: Current User     Types: Chew     Tobacco comment: quit in 2000   Substance and Sexual Activity     Alcohol use: Yes     Comment: rare     Drug use: No     Sexual activity: Yes   Other Topics Concern      Service No     Blood Transfusions No     Caffeine Concern No     Occupational Exposure Not Asked     Hobby Hazards Yes     Comment: scuba diving     Sleep Concern No     Stress Concern Yes     Weight Concern No     Special Diet Not Asked     Back Care Not Asked     Exercise Not Asked     Bike Helmet Not Asked     Seat Belt No     Self-Exams Not Asked     Parent/sibling w/ CABG, MI or angioplasty before 65F 55M? Not Asked   Social History Narrative     Not on file     Social Determinants of Health     Financial Resource Strain:      Difficulty of Paying Living Expenses:    Food Insecurity:      Worried  About Running Out of Food in the Last Year:      Ran Out of Food in the Last Year:    Transportation Needs:      Lack of Transportation (Medical):      Lack of Transportation (Non-Medical):    Physical Activity:      Days of Exercise per Week:      Minutes of Exercise per Session:    Stress:      Feeling of Stress :    Social Connections:      Frequency of Communication with Friends and Family:      Frequency of Social Gatherings with Friends and Family:      Attends Holiness Services:      Active Member of Clubs or Organizations:      Attends Club or Organization Meetings:      Marital Status:    Intimate Partner Violence:      Fear of Current or Ex-Partner:      Emotionally Abused:      Physically Abused:      Sexually Abused:        Family History -   Family History   Problem Relation Age of Onset     Diabetes Maternal Grandfather      Prostate Cancer Paternal Grandfather        Review of Systems - As per HPI and PMHx, otherwise review of system review of the head and neck negative. Otherwise 10+ review of system is negative    Physical Exam  /62 (BP Location: Right arm, Patient Position: Sitting, Cuff Size: Adult Regular)   Temp 96.8  F (36  C) (Temporal)   Ht 1.829 m (6')   Wt 88 kg (194 lb 1.6 oz)   BMI 26.32 kg/m    BMI: Body mass index is 26.32 kg/m .    General - The patient is well nourished and well developed, and appears to have good nutritional status.  Alert and oriented to person and place, answers questions and cooperates with examination appropriately.    SKIN - No suspicious lesions or rashes.  Respiration - No respiratory distress.  Head and Face - Normocephalic and atraumatic, with no gross asymmetry noted of the contour of the facial features.  The facial nerve is intact, with strong symmetric movements.    Voice and Breathing - The patient was breathing comfortably without the use of accessory muscles. The patients voice was clear and strong, and had appropriate pitch and  quality.    Ears - Bilateral pinna and EACs with normal appearing overlying skin. Tympanic membrane intact with good mobility on pneumatic otoscopy bilaterally. Bony landmarks of the ossicular chain are normal. The tympanic membranes are normal in appearance. No retraction, perforation, or masses.  No fluid or purulence was seen in the external canal or the middle ear.     Eyes - Extraocular movements intact.  Sclera were not icteric or injected, conjunctiva were pink and moist.    Mouth - Examination of the oral cavity showed pink, healthy oral mucosa. No lesions or ulcerations noted.  The tongue was mobile and midline, and the dentition were in good condition.  I can visualize the area of where he keeps his chewing tobacco and there appears to be no evidence of ulceration or inflammation visually that area of gingivolabial sulcus anteriorly.    Throat - The walls of the oropharynx were smooth, pink, moist, symmetric, and had no lesions or ulcerations.  The tonsillar pillars and soft palate were symmetric.  Tonsils appear to be a 2-3+ in size with multiple crypts tonsil stones erythema thick secretions around them some thick secretions in the posterior pharyngeal mucosa.  No cobblestoning.  The uvula was midline on elevation.    Neck - Normal midline excursion of the laryngotracheal complex during swallowing.  Full range of motion on passive movement.  Palpation of the occipital, submental, submandibular, internal jugular chain, and supraclavicular nodes did not demonstrate any abnormal lymph nodes or masses.  The carotid pulse was palpable bilaterally.  Palpation of the thyroid was soft and smooth, with no nodules or goiter appreciated.  The trachea was mobile and midline.    Nose - External contour is symmetric, no gross deflection or scars.  Nasal mucosa is pink and moist with no abnormal mucus.  The septum was deviated to the right and somewhat obstructive, turbinates of normal size and position.  No polyps,  masses, or purulence noted on examination.    Neuro - Nonfocal neuro exam is normal, CN 2 through 12 intact, normal gait and muscle tone.      Performed in clinic today:  No procedures preformed in clinic today      A/P - Eric Wilson is a 50 year old male with nonspecific posterior pharyngitis tonsillitis in the last 6 months with a lot of phlegm giving him a feeling of thickening and possibly pharyngeal dysphagia.  Also appears to have history of allergic rhinitis with postnasal drainage that is often copious.  At this point will start conservative treatment with fluticasone to dry up postnasal secretions also compounded Magic mouthwash consisting of Benadryl Decadron and Maalox to dry up anti-inflammatory mucosa reduce inflammation locally.  Considering he also consumes fair amount of caffeinated drinks we urged him to stop those and replace them with water to better hydrate himself especially that topical medications prescribed will dehydrate him further.  Patient will recheck in 6 weeks to see how he has done and will recheck on his symptoms.  We strongly urged him to consider quitting chewing tobacco as soon as he can.    Merritt Yusuf MD      Again, thank you for allowing me to participate in the care of your patient.        Sincerely,        Merritt Yusuf MD, MD

## 2022-02-01 ENCOUNTER — HOSPITAL ENCOUNTER (EMERGENCY)
Facility: CLINIC | Age: 52
Discharge: HOME | End: 2022-02-01
Attending: EMERGENCY MEDICINE | Admitting: EMERGENCY MEDICINE
Payer: COMMERCIAL

## 2022-02-01 VITALS
RESPIRATION RATE: 18 BRPM | BODY MASS INDEX: 26.45 KG/M2 | HEART RATE: 74 BPM | WEIGHT: 195 LBS | OXYGEN SATURATION: 100 % | SYSTOLIC BLOOD PRESSURE: 151 MMHG | TEMPERATURE: 98 F | DIASTOLIC BLOOD PRESSURE: 94 MMHG

## 2022-02-01 DIAGNOSIS — S76.302A HAMSTRING INJURY, LEFT, INITIAL ENCOUNTER: ICD-10-CM

## 2022-02-01 PROCEDURE — 99282 EMERGENCY DEPT VISIT SF MDM: CPT | Performed by: EMERGENCY MEDICINE

## 2022-02-01 PROCEDURE — 99282 EMERGENCY DEPT VISIT SF MDM: CPT

## 2022-02-01 NOTE — DISCHARGE INSTRUCTIONS
Return to the emergency department if you develop new or worsening symptoms.  Most likely had a slight tear anterior hamstring.  This should resolve with time and conservative management such as ice, ibuprofen, rest, crutches, activity as tolerated.  Please follow-up with orthopedics.  If symptoms persist or worsen you may need to have further imaging such as an MRI.  Physical therapy will likely be very helpful in the recovery of this injury.  It was a pleasure to meet you.
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
## 2022-04-11 ENCOUNTER — OFFICE VISIT (OUTPATIENT)
Dept: FAMILY MEDICINE | Facility: CLINIC | Age: 52
End: 2022-04-11
Payer: COMMERCIAL

## 2022-04-11 VITALS
DIASTOLIC BLOOD PRESSURE: 84 MMHG | BODY MASS INDEX: 26.58 KG/M2 | HEART RATE: 80 BPM | TEMPERATURE: 98.1 F | SYSTOLIC BLOOD PRESSURE: 136 MMHG | RESPIRATION RATE: 10 BRPM | OXYGEN SATURATION: 96 % | WEIGHT: 196 LBS

## 2022-04-11 DIAGNOSIS — K13.0 CYST OF LIP: Primary | ICD-10-CM

## 2022-04-11 DIAGNOSIS — L91.8 SKIN TAG: ICD-10-CM

## 2022-04-11 PROCEDURE — 99203 OFFICE O/P NEW LOW 30 MIN: CPT | Performed by: FAMILY MEDICINE

## 2022-04-11 NOTE — PROGRESS NOTES
Assessment & Plan     Cyst of lip  From around lesion on lower inner lip on the right side.  Not tender.  No other lesions seen in the area.  He does chew.  Will have otolaryngology consult.  - Otolaryngology Referral; Future    Skin tag  Several of these on his back.  He states they are pruritic at times.  Reassured they are benign appearing.               BMI:   Estimated body mass index is 26.58 kg/m  as calculated from the following:    Height as of 9/13/21: 1.829 m (6').    Weight as of this encounter: 88.9 kg (196 lb).           No follow-ups on file.    Sanjiv Carrasco MD  [facility] CHELITA Reyes is a 51 year old who presents for the following health issues     History of Present Illness       Reason for visit:  Lump in lip  Symptoms include:  Lump in lip  Symptom intensity:  Mild  Symptom progression:  Staying the same  Had these symptoms before:  No  What makes it worse:  No  What makes it better:  No    He eats 0-1 servings of fruits and vegetables daily.He consumes 3 sweetened beverage(s) daily.He exercises with enough effort to increase his heart rate 10 to 19 minutes per day.  He exercises with enough effort to increase his heart rate 3 or less days per week.   He is taking medications regularly.             Review of Systems   Constitutional, HEENT, cardiovascular, pulmonary, gi and gu systems are negative, except as otherwise noted.      Objective    There were no vitals taken for this visit.  There is no height or weight on file to calculate BMI.  Physical Exam   GENERAL: healthy, alert and no distress  HENT: normal cephalic/atraumatic and inside the lower lip right side is ability 8 mm firm nontender movable lesion can be noticed on the surface a little bit. .   NECK: no adenopathy, no asymmetry, masses, or scars and thyroid normal to palpation  MS: no gross musculoskeletal defects noted, no edema  SKIN: Several fleshy soft very small 4 mm domed skin tags on the  back.  PSYCH: mentation appears normal, affect normal/bright

## 2022-04-19 NOTE — PROGRESS NOTES
History of Present Illness - Eric Wilson is a 51 year old male presenting in clinic today for a recheck on Patient presents with:  RECHECK    Patient who previously was evaluated for swallowing issues in September of last year with reflux related activity as well as significant nasal allergies that he felt was most contributing to his symptoms mild dysphagia is now feeling much better his history and his allergies as well as reflux.  However he comes in with a new problem once ago noticed a raised area on his leg with his has been biting involving his lower lip.  Bothers him a little bit.  No bleeding or ulceration.  Patient does chew tobacco.        BP Readings from Last 1 Encounters:   04/25/22 136/78       BP noted to be well controlled today in office.     Eric IS a smoker/uses chewing tobacco.  Eric is not ready to quit      Past Medical History -   Past Medical History:   Diagnosis Date     Atopic rhinitis 10/22/2009     (Problem list name updated by automated process. Provider to review and confirm.)     Closed fracture of hamate (unciform) bone of wrist 1/19/2007     Contusion of hand 1/1/2007     Problem list name updated by automated process. Provider to review     Foreign body in site on external eye 4/21/2007     Problem list name updated by automated process. Provider to review     NO ACTIVE PROBLEMS        Current Medications -   Current Outpatient Medications:      loratadine-pseudoePHEDrine (CLARITIN-D 24-HOUR)  MG per tablet, Take daily PRN for allergies, Disp: 30 tablet, Rfl: 1    Allergies -   Allergies   Allergen Reactions     Aspirin      Penicillins        Social History -   Social History     Socioeconomic History     Marital status:      Spouse name: Yasmine     Number of children: 2   Occupational History     Occupation:      Employer: SELF EMPLOYED     Employer: SELF   Tobacco Use     Smoking status: Former Smoker     Packs/day: 0.50     Smokeless tobacco: Current  "User     Types: Chew     Tobacco comment: quit in 2000   Substance and Sexual Activity     Alcohol use: Yes     Comment: rare     Drug use: No     Sexual activity: Yes   Other Topics Concern      Service No     Blood Transfusions No     Caffeine Concern No     Hobby Hazards Yes     Comment: scuba diving     Sleep Concern No     Stress Concern Yes     Weight Concern No     Seat Belt No       Family History -   Family History   Problem Relation Age of Onset     Diabetes Maternal Grandfather      Prostate Cancer Paternal Grandfather        Review of Systems - As per HPI and PMHx, otherwise review of system review of the head and neck negative. Otherwise 10+ review of system is negative    Physical Exam  /78 (BP Location: Right arm, Patient Position: Sitting, Cuff Size: Adult Regular)   Temp 97.3  F (36.3  C) (Temporal)   Ht 1.803 m (5' 11\")   Wt 91.4 kg (201 lb 8 oz)   BMI 28.10 kg/m    BMI: Body mass index is 28.1 kg/m .    General - The patient is well nourished and well developed, and appears to have good nutritional status.  Alert and oriented to person and place, answers questions and cooperates with examination appropriately.    SKIN - No suspicious lesions or rashes.  Respiration - No respiratory distress.  Head and Face - Normocephalic and atraumatic, with no gross asymmetry noted of the contour of the facial features.  The facial nerve is intact, with strong symmetric movements.    Voice and Breathing - The patient was breathing comfortably without the use of accessory muscles. The patients voice was clear and strong, and had appropriate pitch and quality.    Ears - Bilateral pinna and EACs with normal appearing overlying skin. Tympanic membrane intact with good mobility on pneumatic otoscopy bilaterally. Bony landmarks of the ossicular chain are normal. The tympanic membranes are normal in appearance. No retraction, perforation, or masses.  No fluid or purulence was seen in the external " canal or the middle ear.     Eyes - Extraocular movements intact.  Sclera were not icteric or injected, conjunctiva were pink and moist.    Mouth - Examination of the oral cavity showed pink, healthy oral mucosa.  I noted on the right lower lip area raised edge with a little bit of apthous center.  The tongue was mobile and midline, and the dentition were in good condition.      Throat - The walls of the oropharynx were smooth, pink, moist, symmetric, and had no lesions or ulcerations.  The tonsillar pillars and soft palate were symmetric. Tonsils are 1+. The uvula was midline on elevation.    Neck - Normal midline excursion of the laryngotracheal complex during swallowing.  Full range of motion on passive movement.  Palpation of the occipital, submental, submandibular, internal jugular chain, and supraclavicular nodes did not demonstrate any abnormal lymph nodes or masses.  The carotid pulse was palpable bilaterally.  Palpation of the thyroid was soft and smooth, with no nodules or goiter appreciated.  The trachea was mobile and midline.    Nose - External contour is symmetric, no gross deflection or scars.  Nasal mucosa is pink and moist with no abnormal mucus.  The septum was midline and non-obstructive, turbinates of normal size and position.  No polyps, masses, or purulence noted on examination.    Neuro - Nonfocal neuro exam is normal, CN 2 through 12 intact, normal gait and muscle tone.      Performed in clinic today:  Due to the presence of the lesion involving lower lip for the last couple months and patient history of chewing tobacco we discussed biopsy.  Patient states risks of biopsy bleeding infection wishes to go ahead with it.  Topical anesthesia was provided with Cetacaine followed by local anesthetic with 1% lidocaine 1-100,000 epinephrine.  Using #15 blade superficial biopsy is performed.  Hemostasis controlled with silver nitrate.  Patient tolerated procedure well with less than 1 mL blood  loss.      A/P - Eric MAICOL Wilson is a 51 year old male Patient presents with:  RECHECK    Patient presented with new lesion of lower lip biopsy is completed today.  Was sent to pathology.  Patient can take ibuprofen for discomfort keep area clean and avoid getting tobacco on it .  Cessation of chewing is discussed again.    Eric should follow up in 1 week.            Merritt Yusuf MD

## 2022-04-25 ENCOUNTER — OFFICE VISIT (OUTPATIENT)
Dept: OTOLARYNGOLOGY | Facility: CLINIC | Age: 52
End: 2022-04-25
Payer: COMMERCIAL

## 2022-04-25 VITALS
HEIGHT: 71 IN | TEMPERATURE: 97.3 F | BODY MASS INDEX: 28.21 KG/M2 | DIASTOLIC BLOOD PRESSURE: 78 MMHG | SYSTOLIC BLOOD PRESSURE: 136 MMHG | WEIGHT: 201.5 LBS

## 2022-04-25 DIAGNOSIS — K13.0 LIP LESION: ICD-10-CM

## 2022-04-25 PROCEDURE — 40490 BIOPSY OF LIP: CPT | Performed by: OTOLARYNGOLOGY

## 2022-04-25 PROCEDURE — 99213 OFFICE O/P EST LOW 20 MIN: CPT | Mod: 25 | Performed by: OTOLARYNGOLOGY

## 2022-04-25 PROCEDURE — 88305 TISSUE EXAM BY PATHOLOGIST: CPT | Performed by: PATHOLOGY

## 2022-04-25 ASSESSMENT — PAIN SCALES - GENERAL: PAINLEVEL: NO PAIN (0)

## 2022-04-25 NOTE — LETTER
4/25/2022         RE: Eric Wilson  [address]  [facility] [address]        Dear Colleague,    Thank you for referring your patient, Eric Wilson, to the [facility]. Please see a copy of my visit note below.    History of Present Illness - Eric Wilson is a 51 year old male presenting in clinic today for a recheck on Patient presents with:  RECHECK    Patient who previously was evaluated for swallowing issues in September of last year with reflux related activity as well as significant nasal allergies that he felt was most contributing to his symptoms mild dysphagia is now feeling much better his history and his allergies as well as reflux.  However he comes in with a new problem once ago noticed a raised area on his leg with his has been biting involving his lower lip.  Bothers him a little bit.  No bleeding or ulceration.  Patient does chew tobacco.        BP Readings from Last 1 Encounters:   04/25/22 136/78       BP noted to be well controlled today in office.     Eric IS a smoker/uses chewing tobacco.  Eric is not ready to quit      Past Medical History -   Past Medical History:   Diagnosis Date     Atopic rhinitis 10/22/2009     (Problem list name updated by automated process. Provider to review and confirm.)     Closed fracture of hamate (unciform) bone of wrist 1/19/2007     Contusion of hand 1/1/2007     Problem list name updated by automated process. Provider to review     Foreign body in site on external eye 4/21/2007     Problem list name updated by automated process. Provider to review     NO ACTIVE PROBLEMS        Current Medications -   Current Outpatient Medications:      loratadine-pseudoePHEDrine (CLARITIN-D 24-HOUR)  MG per tablet, Take daily PRN for allergies, Disp: 30 tablet, Rfl: 1    Allergies -   Allergies   Allergen Reactions     Aspirin      Penicillins        Social History -   Social History     Socioeconomic History     Marital status:       "Spouse name: Yasmine     Number of children: 2   Occupational History     Occupation:      Employer: SELF EMPLOYED     Employer: SELF   Tobacco Use     Smoking status: Former Smoker     Packs/day: 0.50     Smokeless tobacco: Current User     Types: Chew     Tobacco comment: quit in 2000   Substance and Sexual Activity     Alcohol use: Yes     Comment: rare     Drug use: No     Sexual activity: Yes   Other Topics Concern      Service No     Blood Transfusions No     Caffeine Concern No     Hobby Hazards Yes     Comment: scuba diving     Sleep Concern No     Stress Concern Yes     Weight Concern No     Seat Belt No       Family History -   Family History   Problem Relation Age of Onset     Diabetes Maternal Grandfather      Prostate Cancer Paternal Grandfather        Review of Systems - As per HPI and PMHx, otherwise review of system review of the head and neck negative. Otherwise 10+ review of system is negative    Physical Exam  /78 (BP Location: Right arm, Patient Position: Sitting, Cuff Size: Adult Regular)   Temp 97.3  F (36.3  C) (Temporal)   Ht 1.803 m (5' 11\")   Wt 91.4 kg (201 lb 8 oz)   BMI 28.10 kg/m    BMI: Body mass index is 28.1 kg/m .    General - The patient is well nourished and well developed, and appears to have good nutritional status.  Alert and oriented to person and place, answers questions and cooperates with examination appropriately.    SKIN - No suspicious lesions or rashes.  Respiration - No respiratory distress.  Head and Face - Normocephalic and atraumatic, with no gross asymmetry noted of the contour of the facial features.  The facial nerve is intact, with strong symmetric movements.    Voice and Breathing - The patient was breathing comfortably without the use of accessory muscles. The patients voice was clear and strong, and had appropriate pitch and quality.    Ears - Bilateral pinna and EACs with normal appearing overlying skin. Tympanic membrane intact " with good mobility on pneumatic otoscopy bilaterally. Bony landmarks of the ossicular chain are normal. The tympanic membranes are normal in appearance. No retraction, perforation, or masses.  No fluid or purulence was seen in the external canal or the middle ear.     Eyes - Extraocular movements intact.  Sclera were not icteric or injected, conjunctiva were pink and moist.    Mouth - Examination of the oral cavity showed pink, healthy oral mucosa.  I noted on the right lower lip area raised edge with a little bit of apthous center.  The tongue was mobile and midline, and the dentition were in good condition.      Throat - The walls of the oropharynx were smooth, pink, moist, symmetric, and had no lesions or ulcerations.  The tonsillar pillars and soft palate were symmetric. Tonsils are 1+. The uvula was midline on elevation.    Neck - Normal midline excursion of the laryngotracheal complex during swallowing.  Full range of motion on passive movement.  Palpation of the occipital, submental, submandibular, internal jugular chain, and supraclavicular nodes did not demonstrate any abnormal lymph nodes or masses.  The carotid pulse was palpable bilaterally.  Palpation of the thyroid was soft and smooth, with no nodules or goiter appreciated.  The trachea was mobile and midline.    Nose - External contour is symmetric, no gross deflection or scars.  Nasal mucosa is pink and moist with no abnormal mucus.  The septum was midline and non-obstructive, turbinates of normal size and position.  No polyps, masses, or purulence noted on examination.    Neuro - Nonfocal neuro exam is normal, CN 2 through 12 intact, normal gait and muscle tone.      Performed in clinic today:  Due to the presence of the lesion involving lower lip for the last couple months and patient history of chewing tobacco we discussed biopsy.  Patient states risks of biopsy bleeding infection wishes to go ahead with it.  Topical anesthesia was provided with  Cetacaine followed by local anesthetic with 1% lidocaine 1-100,000 epinephrine.  Using #15 blade superficial biopsy is performed.  Hemostasis controlled with silver nitrate.  Patient tolerated procedure well with less than 1 mL blood loss.      A/P - Eric Wilson is a 51 year old male Patient presents with:  RECHECK    Patient presented with new lesion of lower lip biopsy is completed today.  Was sent to pathology.  Patient can take ibuprofen for discomfort keep area clean and avoid getting tobacco on it .  Cessation of chewing is discussed again.    Eric should follow up in 1 week.            Merritt Yusuf MD            Again, thank you for allowing me to participate in the care of your patient.        Sincerely,        Merritt Yusuf MD, MD

## 2022-04-27 LAB
PATH REPORT.COMMENTS IMP SPEC: NORMAL
PATH REPORT.FINAL DX SPEC: NORMAL
PATH REPORT.GROSS SPEC: NORMAL
PATH REPORT.MICROSCOPIC SPEC OTHER STN: NORMAL
PATH REPORT.RELEVANT HX SPEC: NORMAL
PHOTO IMAGE: NORMAL

## 2023-04-18 ENCOUNTER — TELEPHONE (OUTPATIENT)
Dept: SLEEP MEDICINE | Facility: CLINIC | Age: 53
End: 2023-04-18
Payer: COMMERCIAL

## 2023-04-18 DIAGNOSIS — T78.40XA ALLERGY: Primary | ICD-10-CM

## 2023-04-18 NOTE — TELEPHONE ENCOUNTER
Reason for Call:  Other appointment    Detailed comments: Patient calling wondering if Dr. Yusuf would be able to place an order in his chart for an allergy doctor. He has an appointment in July but they told him if an order was placed, they could get him in sooner. Patient says he discussed seeing allergist with Dr. Yusuf at his last visit.     Phone Number Patient can be reached at: Home number on file [phone] (home)    Best Time: any    Can we leave a detailed message on this number? YES    Call taken on 4/18/2023 at 9:53 AM by Dorcas Jenkins

## 2023-04-18 NOTE — TELEPHONE ENCOUNTER
Please advise if you are willing to do referral to allergist. Pt was last seen 4/25/22 and allergist was not noted in office visit notes. Char Gaines, CMA

## 2023-04-19 NOTE — TELEPHONE ENCOUNTER
Per Dr.Froymovich harris for orders for the allergist. I called pt and informed him the order was placed.   Dorcas Ramsey MA

## 2023-05-10 ENCOUNTER — OFFICE VISIT (OUTPATIENT)
Dept: ALLERGY | Facility: OTHER | Age: 53
End: 2023-05-10
Payer: COMMERCIAL

## 2023-05-10 VITALS
SYSTOLIC BLOOD PRESSURE: 129 MMHG | BODY MASS INDEX: 27.47 KG/M2 | HEART RATE: 68 BPM | OXYGEN SATURATION: 99 % | HEIGHT: 71 IN | DIASTOLIC BLOOD PRESSURE: 78 MMHG | WEIGHT: 196.21 LBS

## 2023-05-10 DIAGNOSIS — J31.0 CHRONIC RHINITIS: Primary | ICD-10-CM

## 2023-05-10 PROCEDURE — 99203 OFFICE O/P NEW LOW 30 MIN: CPT | Mod: 25 | Performed by: ALLERGY & IMMUNOLOGY

## 2023-05-10 PROCEDURE — 95004 PERQ TESTS W/ALRGNC XTRCS: CPT | Performed by: ALLERGY & IMMUNOLOGY

## 2023-05-10 RX ORDER — FLUTICASONE PROPIONATE 50 MCG
1 SPRAY, SUSPENSION (ML) NASAL DAILY
COMMUNITY
End: 2023-07-05

## 2023-05-10 RX ORDER — TRIAMCINOLONE ACETONIDE 55 UG/1
2 SPRAY, METERED NASAL DAILY
COMMUNITY

## 2023-05-10 RX ORDER — FLUTICASONE PROPIONATE 50 MCG
1-2 SPRAY, SUSPENSION (ML) NASAL DAILY
Qty: 16 G | Refills: 3 | Status: SHIPPED | OUTPATIENT
Start: 2023-05-10

## 2023-05-10 RX ORDER — AZELASTINE 1 MG/ML
2 SPRAY, METERED NASAL 2 TIMES DAILY PRN
Qty: 30 ML | Refills: 3 | Status: SHIPPED | OUTPATIENT
Start: 2023-05-10

## 2023-05-10 ASSESSMENT — ENCOUNTER SYMPTOMS
ADENOPATHY: 0
SINUS PRESSURE: 0
ACTIVITY CHANGE: 0
EYE ITCHING: 0
DIARRHEA: 0
FATIGUE: 0
EYE REDNESS: 0
FEVER: 0
COUGH: 0
WHEEZING: 0
EYE DISCHARGE: 0
SHORTNESS OF BREATH: 0
STRIDOR: 0
HEADACHES: 0
NAUSEA: 0
CHEST TIGHTNESS: 0
FACIAL SWELLING: 0
RHINORRHEA: 1
VOMITING: 0

## 2023-05-10 NOTE — LETTER
5/10/2023         RE: Eric Wilson  [address]  [facility] [address]        Dear Colleague,    Thank you for referring your patient, Eric Wilson, to the [facility]. Please see a copy of my visit note below.    SUBJECTIVE:                                                                   Eric Wilson is a 52-year-old male who presents today to our Allergy Clinic at [facility]; He is being seen in consultation at the request of Dr. Merritt Yusuf for allergic rhinitis evaluation.    History of chronic rhinitis symptoms for the past 25 years.  Perennial pattern without seasonal exacerbations.  Manifested by nasal congestion thoroughly, but may alternate sides postnasal drainage, and somewhat decreased sense of smell.  He is between intranasal fluticasone and intranasal triamcinolone.  He also takes Claritin-D as needed.  On this regimen, his symptoms are 50% controlled.  Sometimes, when his symptoms are bad enough, his wife may witness apneic episodes  He feels that his symptoms are worse with dust exposure.He was tested in 2005.  Was he was allergic to dust mites and weed pollen.      Patient Active Problem List   Diagnosis     Hyperlipidemia LDL goal <130     Pelvic pain in male       Past Medical History:   Diagnosis Date     Atopic rhinitis 10/22/2009     (Problem list name updated by automated process. Provider to review and confirm.)     Closed fracture of hamate (unciform) bone of wrist 1/19/2007     Contusion of hand 1/1/2007     Problem list name updated by automated process. Provider to review     Foreign body in site on external eye 4/21/2007     Problem list name updated by automated process. Provider to review     NO ACTIVE PROBLEMS       Problem (# of Occurrences) Relation (Name,Age of Onset)    Diabetes (1) Maternal Grandfather    Prostate Cancer (1) Paternal Grandfather        Past Surgical History:   Procedure Laterality Date     HC OPEN RX  CARPAL BONE FX,EACH BONE  01/24/07    Open reduction internal fixation, right hand     NO HISTORY OF SURGERY       Social History     Socioeconomic History     Marital status:      Spouse name: Yasmine     Number of children: 2     Years of education: None     Highest education level: None   Occupational History     Occupation:      Employer: SELF EMPLOYED     Employer: SELF   Tobacco Use     Smoking status: Former     Packs/day: 0.50     Types: Cigarettes     Smokeless tobacco: Current     Types: Chew     Tobacco comments:     3 tins per wk   Substance and Sexual Activity     Alcohol use: Yes     Comment: rare     Drug use: No     Sexual activity: Yes   Other Topics Concern      Service No     Blood Transfusions No     Caffeine Concern No     Hobby Hazards Yes     Comment: scuba diving     Sleep Concern No     Stress Concern Yes     Weight Concern No     Seat Belt No   Social History Narrative    5/10/23    ENVIRONMENTAL HISTORY: [place] is heated with a wood stove. They does not have central air conditioning. The patient's bedroom is furnished with carpeting in bedroom.  Pets inside the house include 0 pets inside. Outside horses and cows. There is no history of cockroach or mice infestation. There is/are 0 smokers in the house.  The house does not have a damp basement.            Review of Systems   Constitutional: Negative for activity change, fatigue and fever.   HENT: Positive for congestion, postnasal drip and rhinorrhea. Negative for ear pain, facial swelling, nosebleeds, sinus pressure and sneezing.    Eyes: Negative for discharge, redness and itching.   Respiratory: Negative for cough, chest tightness, shortness of breath, wheezing and stridor.    Gastrointestinal: Negative for diarrhea, nausea and vomiting.   Skin: Negative for rash.   Allergic/Immunologic: Negative for environmental allergies.   Neurological: Negative for  "headaches.   Hematological: Negative for adenopathy.   Psychiatric/Behavioral: Negative for self-injury.           Current Outpatient Medications:      azelastine (ASTELIN) 0.1 % nasal spray, Spray 2 sprays into both nostrils 2 times daily as needed for rhinitis, Disp: 30 mL, Rfl: 3     fluticasone (FLONASE) 50 MCG/ACT nasal spray, Spray 1 spray into both nostrils daily, Disp: , Rfl:      fluticasone (FLONASE) 50 MCG/ACT nasal spray, Spray 1-2 sprays into both nostrils daily, Disp: 16 g, Rfl: 3     triamcinolone (NASACORT) 55 MCG/ACT nasal aerosol, Spray 2 sprays into both nostrils daily, Disp: , Rfl:      loratadine-pseudoePHEDrine (CLARITIN-D 24-HOUR)  MG per tablet, Take daily PRN for allergies, Disp: 30 tablet, Rfl: 1  Immunization History   Administered Date(s) Administered     Hepatitis B, Adult 12/29/2010, 02/01/2011, 07/27/2011     Historical DTP/aP 02/14/1973, 03/21/1973, 04/26/1973     OPV, trivalent, live 03/21/1973, 06/01/1973, 04/25/1974     TD,PF 7+ (Tenivac) 01/20/1992, 09/09/2004     TDAP Vaccine (Adacel) 04/28/2019     Td (Adult), Adsorbed 09/09/2004, 07/23/2008     Allergies   Allergen Reactions     Aspirin      Penicillins      OBJECTIVE:                                                                 /78   Pulse 68   Ht 1.803 m (5' 11\")   Wt 89 kg (196 lb 3.4 oz)   SpO2 99%   BMI 27.37 kg/m          Physical Exam  Vitals and nursing note reviewed.   Constitutional:       General: He is not in acute distress.     Appearance: He is not diaphoretic.   HENT:      Head: Normocephalic and atraumatic.      Right Ear: Tympanic membrane, ear canal and external ear normal.      Left Ear: Tympanic membrane, ear canal and external ear normal.      Nose: No mucosal edema or rhinorrhea.      Right Turbinates: Not enlarged, swollen or pale.      Left Turbinates: Not enlarged, swollen or pale.      Mouth/Throat:      Lips: Pink.      Mouth: Mucous membranes are moist.      Pharynx: Oropharynx is " clear. No pharyngeal swelling, oropharyngeal exudate or posterior oropharyngeal erythema.   Eyes:      General:         Right eye: No discharge.         Left eye: No discharge.      Conjunctiva/sclera: Conjunctivae normal.   Cardiovascular:      Rate and Rhythm: Normal rate and regular rhythm.      Heart sounds: Normal heart sounds. No murmur heard.  Pulmonary:      Effort: Pulmonary effort is normal. No respiratory distress.      Breath sounds: Normal breath sounds and air entry. No stridor, decreased air movement or transmitted upper airway sounds. No decreased breath sounds, wheezing, rhonchi or rales.   Neurological:      Mental Status: He is alert and oriented to person, place, and time.   Psychiatric:         Mood and Affect: Mood normal.         Behavior: Behavior normal.               WORKUP:     At today's visit the patient and I engaged in an informed consent discussion about allergy testing.  We discussed skin testing, blood testing, and the alternative of not undergoing any testing. The patient has a preference for skin testing. We then discussed the risks and benefits of skin testing.  The patient understands skin testing risks can include, but are not limited to, urticaria, angioedema, shortness of breath, and severe anaphylaxis.  The benefits include, but are not limited, to evaluation for allergens causing symptoms.  After answering the patient's questions they have agreed to proceed with skin testing.        ENVIRONMENTAL PERCUTANEOUS SKIN TESTING: ADULT      5/10/2023     3:00 PM   [facility]   Consent Y   Ordering Physician Cortez   Interpreting Physician Cortez   Testing Technician Joan   Location Back   Time start:  3:30 PM   Time End:  3:45 PM   Positive Control: Histatrol*ALK 1 mg/ml 5/25   Negative Control: 50% Glycerin 0   Cat Hair*ALK (10,000 BAU/ml) 0   AP Dog Hair/Dander (1:100 w/v) 0   Dust Mite p. 30,000 AU/ml 0   Dust Mite f. (30,000 AU/ml) 0   Danilo (W/F in millimeters)  0   Raphael Grass (100,000 BAU/mL) 0   Red Cedar (W/F in millimeters) 0   Maple/Pender (W/F in millimeters) 0   Hackberry (W/F in millimeters) 0   Oakland (W/F in millimeters) 0   Dodson *ALK (W/F in millimeters) 0   American Elm (W/F in millimeters) 0   Litchfield (W/F in millimeters) 0   Black Kouts (W/F in millimeters) 0   Birch Mix (W/F in millimeters) 0   P[place] (W/F in millimeters) 0   Oak (W/F in millimeters) 0   Cocklebur (W/F in millimeters) 0   Stout (W/F in millimeters) 0   White Juan (W/F in millimeters) 0   Careless (W/F in millimeters) 0   Nettle (W/F in millimeters) 0   English Plantain (W/F in millimeters) 0   Kochia (W/F in millimeters) 0   Lamb's Quarter (W/F in millimeters) 0   Marshelder (W/F in millimeters) 0   Ragweed Mix* ALK (W/F in millimeters) 0   Russian Thistle (W/F in millimeters) 0   Sagebrush/Mugwort (W/F in millimeters) 0   Sheep Sorrel (W/F in millimeters) 0   Feather Mix* ALK (W/F in millimeters) 0   Penicillium Mix (1:10 w/v) 0   Curvularia spicifera (1:10 w/v) 0   Epicoccum (1:10 w/v) 0   Aspergillus fumigatus (1:10 w/v): 0   Alternaria tenius (1:10 w/v) 0   H. Cladosporium (1:10 w/v) 0   Phoma herbarum (1:10 w/v) 0      My interpretation: Percutaneous skin puncture testing for aeroallergens performed today was negative with appropriate responses to positive and negative controls.    ASSESSMENT/PLAN:    Chronic rhinitis    Negative percutaneous skin puncture testing for aeroallergens suggesting a lack of sensitivity to tested environmental/seasonal allergens.  Unable to recommend avoidance measures or allergen immunotherapy.    - Use intranasal fluticasone (Flonase) 1-2 sprays in each nostril once daily.  - Add azelastine nasal spray, 2 sprays in each nostril twice daily as needed.    Asked to pay close attention to apneic episodes. They will let me or Dr. Yusuf know if they persist.  May consider serum IgE for the regional aeroallergen panel, but considering his  high deductible insurance and the fact that most of the time, skin test results and in vitro studies go hand-in-hand, we agreed not to order any additional labs.      - ALLERGY SKIN TESTS,ALLERGENS  - fluticasone (FLONASE) 50 MCG/ACT nasal spray  Dispense: 16 g; Refill: 3  - azelastine (ASTELIN) 0.1 % nasal spray  Dispense: 30 mL; Refill: 3       Return in about 6 weeks (around 6/21/2023), or if symptoms worsen or fail to improve.    Thank you for allowing us to participate in the care of this patient. Please feel free to contact us if there are any questions or concerns about the patient.    Disclaimer: This note consists of symbols derived from keyboarding, dictation and/or voice recognition software. As a result, there may be errors in the script that have gone undetected. Please consider this when interpreting information found in this chart.    Cam Toro MD, FAAAAI, FACJOELLENI  Allergy, Asthma and Immunology     [facility]        Again, thank you for allowing me to participate in the care of your patient.        Sincerely,        Cam Toro MD

## 2023-05-10 NOTE — PROGRESS NOTES
SUBJECTIVE:                                                                   Eric Wilson is a 52-year-old male who presents today to our Allergy Clinic at [facility]; He is being seen in consultation at the request of Dr. Merritt Yusuf for allergic rhinitis evaluation.    History of chronic rhinitis symptoms for the past 25 years.  Perennial pattern without seasonal exacerbations.  Manifested by nasal congestion thoroughly, but may alternate sides postnasal drainage, and somewhat decreased sense of smell.  He is between intranasal fluticasone and intranasal triamcinolone.  He also takes Claritin-D as needed.  On this regimen, his symptoms are 50% controlled.  Sometimes, when his symptoms are bad enough, his wife may witness apneic episodes  He feels that his symptoms are worse with dust exposure.He was tested in 2005.  Was he was allergic to dust mites and weed pollen.      Patient Active Problem List   Diagnosis     Hyperlipidemia LDL goal <130     Pelvic pain in male       Past Medical History:   Diagnosis Date     Atopic rhinitis 10/22/2009     (Problem list name updated by automated process. Provider to review and confirm.)     Closed fracture of hamate (unciform) bone of wrist 1/19/2007     Contusion of hand 1/1/2007     Problem list name updated by automated process. Provider to review     Foreign body in site on external eye 4/21/2007     Problem list name updated by automated process. Provider to review     NO ACTIVE PROBLEMS       Problem (# of Occurrences) Relation (Name,Age of Onset)    Diabetes (1) Maternal Grandfather    Prostate Cancer (1) Paternal Grandfather        Past Surgical History:   Procedure Laterality Date     HC OPEN RX CARPAL BONE FX,EACH BONE  01/24/07    Open reduction internal fixation, right hand     NO HISTORY OF SURGERY       Social History     Socioeconomic History     Marital status:      Spouse name: Yasmine     Number of children: 2     Years of  education: None     Highest education level: None   Occupational History     Occupation:      Employer: SELF EMPLOYED     Employer: SELF   Tobacco Use     Smoking status: Former     Packs/day: 0.50     Types: Cigarettes     Smokeless tobacco: Current     Types: Chew     Tobacco comments:     3 tins per wk   Substance and Sexual Activity     Alcohol use: Yes     Comment: rare     Drug use: No     Sexual activity: Yes   Other Topics Concern      Service No     Blood Transfusions No     Caffeine Concern No     Hobby Hazards Yes     Comment: scuba diving     Sleep Concern No     Stress Concern Yes     Weight Concern No     Seat Belt No   Social History Narrative    5/10/23    ENVIRONMENTAL HISTORY: [place] is heated with a wood stove. They does not have central air conditioning. The patient's bedroom is furnished with carpeting in bedroom.  Pets inside the house include 0 pets inside. Outside horses and cows. There is no history of cockroach or mice infestation. There is/are 0 smokers in the house.  The house does not have a damp basement.            Review of Systems   Constitutional: Negative for activity change, fatigue and fever.   HENT: Positive for congestion, postnasal drip and rhinorrhea. Negative for ear pain, facial swelling, nosebleeds, sinus pressure and sneezing.    Eyes: Negative for discharge, redness and itching.   Respiratory: Negative for cough, chest tightness, shortness of breath, wheezing and stridor.    Gastrointestinal: Negative for diarrhea, nausea and vomiting.   Skin: Negative for rash.   Allergic/Immunologic: Negative for environmental allergies.   Neurological: Negative for headaches.   Hematological: Negative for adenopathy.   Psychiatric/Behavioral: Negative for self-injury.           Current Outpatient Medications:      azelastine (ASTELIN) 0.1 % nasal spray, Spray 2 sprays into both nostrils 2 times daily as needed  "for rhinitis, Disp: 30 mL, Rfl: 3     fluticasone (FLONASE) 50 MCG/ACT nasal spray, Spray 1 spray into both nostrils daily, Disp: , Rfl:      fluticasone (FLONASE) 50 MCG/ACT nasal spray, Spray 1-2 sprays into both nostrils daily, Disp: 16 g, Rfl: 3     triamcinolone (NASACORT) 55 MCG/ACT nasal aerosol, Spray 2 sprays into both nostrils daily, Disp: , Rfl:      loratadine-pseudoePHEDrine (CLARITIN-D 24-HOUR)  MG per tablet, Take daily PRN for allergies, Disp: 30 tablet, Rfl: 1  Immunization History   Administered Date(s) Administered     Hepatitis B, Adult 12/29/2010, 02/01/2011, 07/27/2011     Historical DTP/aP 02/14/1973, 03/21/1973, 04/26/1973     OPV, trivalent, live 03/21/1973, 06/01/1973, 04/25/1974     TD,PF 7+ (Tenivac) 01/20/1992, 09/09/2004     TDAP Vaccine (Adacel) 04/28/2019     Td (Adult), Adsorbed 09/09/2004, 07/23/2008     Allergies   Allergen Reactions     Aspirin      Penicillins      OBJECTIVE:                                                                 /78   Pulse 68   Ht 1.803 m (5' 11\")   Wt 89 kg (196 lb 3.4 oz)   SpO2 99%   BMI 27.37 kg/m          Physical Exam  Vitals and nursing note reviewed.   Constitutional:       General: He is not in acute distress.     Appearance: He is not diaphoretic.   HENT:      Head: Normocephalic and atraumatic.      Right Ear: Tympanic membrane, ear canal and external ear normal.      Left Ear: Tympanic membrane, ear canal and external ear normal.      Nose: No mucosal edema or rhinorrhea.      Right Turbinates: Not enlarged, swollen or pale.      Left Turbinates: Not enlarged, swollen or pale.      Mouth/Throat:      Lips: Pink.      Mouth: Mucous membranes are moist.      Pharynx: Oropharynx is clear. No pharyngeal swelling, oropharyngeal exudate or posterior oropharyngeal erythema.   Eyes:      General:         Right eye: No discharge.         Left eye: No discharge.      Conjunctiva/sclera: Conjunctivae normal.   Cardiovascular:      " Rate and Rhythm: Normal rate and regular rhythm.      Heart sounds: Normal heart sounds. No murmur heard.  Pulmonary:      Effort: Pulmonary effort is normal. No respiratory distress.      Breath sounds: Normal breath sounds and air entry. No stridor, decreased air movement or transmitted upper airway sounds. No decreased breath sounds, wheezing, rhonchi or rales.   Neurological:      Mental Status: He is alert and oriented to person, place, and time.   Psychiatric:         Mood and Affect: Mood normal.         Behavior: Behavior normal.               WORKUP:     At today's visit the patient and I engaged in an informed consent discussion about allergy testing.  We discussed skin testing, blood testing, and the alternative of not undergoing any testing. The patient has a preference for skin testing. We then discussed the risks and benefits of skin testing.  The patient understands skin testing risks can include, but are not limited to, urticaria, angioedema, shortness of breath, and severe anaphylaxis.  The benefits include, but are not limited, to evaluation for allergens causing symptoms.  After answering the patient's questions they have agreed to proceed with skin testing.        ENVIRONMENTAL PERCUTANEOUS SKIN TESTING: ADULT      5/10/2023     3:00 PM   [facility]   Consent Y   Ordering Physician Cortez   Interpreting Physician BELENkevan   Testing Technician Joan   Location Back   Time start:  3:30 PM   Time End:  3:45 PM   Positive Control: Histatrol*ALK 1 mg/ml 5/25   Negative Control: 50% Glycerin 0   Cat Hair*ALK (10,000 BAU/ml) 0   AP Dog Hair/Dander (1:100 w/v) 0   Dust Mite p. 30,000 AU/ml 0   Dust Mite f. (30,000 AU/ml) 0   Danilo (W/F in millimeters) 0   R[place] (100,000 BAU/mL) 0   Red Cedar (W/F in millimeters) 0   Maple/[place] (W/F in millimeters) 0   Hackberry (W/F in millimeters) 0   Gove (W/F in millimeters) 0   Bond *ALK (W/F in millimeters) 0   American Elm (W/F in  millimeters) 0   [place] (W/F in millimeters) 0   [place] (W/F in millimeters) 0   Birch Mix (W/F in millimeters) 0   [place] (W/F in millimeters) 0   Oak (W/F in millimeters) 0   Cocklebur (W/F in millimeters) 0   [place] (W/F in millimeters) 0   [place] (W/F in millimeters) 0   Careless (W/F in millimeters) 0   Nettle (W/F in millimeters) 0   English Plantain (W/F in millimeters) 0   Kochia (W/F in millimeters) 0   Lamb's Quarter (W/F in millimeters) 0   Marshelder (W/F in millimeters) 0   Ragweed Mix* ALK (W/F in millimeters) 0   Russian Thistle (W/F in millimeters) 0   Sagebrush/Mugwort (W/F in millimeters) 0   Sheep Sorrel (W/F in millimeters) 0   Feather Mix* ALK (W/F in millimeters) 0   Penicillium Mix (1:10 w/v) 0   Curvularia spicifera (1:10 w/v) 0   Epicoccum (1:10 w/v) 0   Aspergillus fumigatus (1:10 w/v): 0   Alternaria tenius (1:10 w/v) 0   H. Cladosporium (1:10 w/v) 0   Phoma herbarum (1:10 w/v) 0      My interpretation: Percutaneous skin puncture testing for aeroallergens performed today was negative with appropriate responses to positive and negative controls.    ASSESSMENT/PLAN:    Chronic rhinitis    Negative percutaneous skin puncture testing for aeroallergens suggesting a lack of sensitivity to tested environmental/seasonal allergens.  Unable to recommend avoidance measures or allergen immunotherapy.    - Use intranasal fluticasone (Flonase) 1-2 sprays in each nostril once daily.  - Add azelastine nasal spray, 2 sprays in each nostril twice daily as needed.    Asked to pay close attention to apneic episodes. They will let me or Dr. Yusuf know if they persist.  May consider serum IgE for the regional aeroallergen panel, but considering his high deductible insurance and the fact that most of the time, skin test results and in vitro studies go hand-in-hand, we agreed not to order any additional labs.      - ALLERGY SKIN TESTS,ALLERGENS  - fluticasone (FLONASE) 50 MCG/ACT nasal spray   Dispense: 16 g; Refill: 3  - azelastine (ASTELIN) 0.1 % nasal spray  Dispense: 30 mL; Refill: 3       Return in about 6 weeks (around 6/21/2023), or if symptoms worsen or fail to improve.    Thank you for allowing us to participate in the care of this patient. Please feel free to contact us if there are any questions or concerns about the patient.    Disclaimer: This note consists of symbols derived from keyboarding, dictation and/or voice recognition software. As a result, there may be errors in the script that have gone undetected. Please consider this when interpreting information found in this chart.    Cam Toro MD, FAAAAI, FACAAI  Allergy, Asthma and Immunology     [facility]

## 2023-05-10 NOTE — PATIENT INSTRUCTIONS
-Start intranasal fluticasone (Flonase) 1-2 sprays in each nostril once daily.  -Start azelastine nasal spray, 2 sprays in each nostril twice daily as needed.     May need to refer to ENT.         Dr Toro Scheduling:  [facility] (Tues / Wed) appointment line: [phone]  [place] allergy shot room: [phone]  [facility] (Thurs / Fri) appointment line: [phone]    Pulmonary Function Scheduling:  [place] - [phone]  [facility] [phone]  [place] - [phone]     Prescription Assistance  If you need assistance with your prescriptions (cost, coverage, etc) please contact: [place] [facility] Assistance [facility] [phone]

## 2023-07-05 ENCOUNTER — OFFICE VISIT (OUTPATIENT)
Dept: ALLERGY | Facility: OTHER | Age: 53
End: 2023-07-05
Payer: COMMERCIAL

## 2023-07-05 VITALS
TEMPERATURE: 97.7 F | BODY MASS INDEX: 26.6 KG/M2 | DIASTOLIC BLOOD PRESSURE: 87 MMHG | OXYGEN SATURATION: 100 % | SYSTOLIC BLOOD PRESSURE: 141 MMHG | HEIGHT: 71 IN | HEART RATE: 59 BPM | WEIGHT: 190 LBS

## 2023-07-05 DIAGNOSIS — J31.0 CHRONIC RHINITIS: Primary | ICD-10-CM

## 2023-07-05 DIAGNOSIS — R06.81 APNEIC EPISODE: ICD-10-CM

## 2023-07-05 PROCEDURE — 99214 OFFICE O/P EST MOD 30 MIN: CPT | Performed by: ALLERGY & IMMUNOLOGY

## 2023-07-05 RX ORDER — IPRATROPIUM BROMIDE 42 UG/1
2 SPRAY, METERED NASAL 3 TIMES DAILY PRN
Qty: 15 ML | Refills: 3 | Status: SHIPPED | OUTPATIENT
Start: 2023-07-05 | End: 2023-08-17

## 2023-07-05 ASSESSMENT — ENCOUNTER SYMPTOMS
ACTIVITY CHANGE: 0
STRIDOR: 0
WHEEZING: 0
EYE DISCHARGE: 0
EYE ITCHING: 0
RHINORRHEA: 1
CHEST TIGHTNESS: 0
EYE REDNESS: 0
FACIAL SWELLING: 0
COUGH: 0
FEVER: 0
SINUS PRESSURE: 0
VOMITING: 0
HEADACHES: 0
NAUSEA: 0
SHORTNESS OF BREATH: 0
DIARRHEA: 0
FATIGUE: 0
ADENOPATHY: 0

## 2023-07-05 NOTE — PROGRESS NOTES
"SUBJECTIVE:                                                                   Eric Wilson presents today to our Allergy Clinic at [facility] for a follow up visit. He is a 52 year old male with chronic rhinitis.  History of chronic rhinitis since his 20s.  Perennial pattern without seasonal exacerbations.  In May 2023, SPT for aeroallergens was negative with appropriate responses to positive and negative controls.    His wife reports a couple of apneic episodes at night.   He uses intranasal fluticasone and azelastine 2 sprays in each nostril twice daily. He reports a \"slight\" improvement in nasal congestion. He develops rhinorrhea in the morning, but in 10 minutes, he is fine.   He reports postnasal drip. There is a thick phlegm in his throat. It got better with a combination of azelastine and intranasal fluticasone.       Patient Active Problem List   Diagnosis     Hyperlipidemia LDL goal <130     Pelvic pain in male       Past Medical History:   Diagnosis Date     Atopic rhinitis 10/22/2009     (Problem list name updated by automated process. Provider to review and confirm.)     Closed fracture of hamate (unciform) bone of wrist 1/19/2007     Contusion of hand 1/1/2007     Problem list name updated by automated process. Provider to review     Foreign body in site on external eye 4/21/2007     Problem list name updated by automated process. Provider to review     NO ACTIVE PROBLEMS       Problem (# of Occurrences) Relation (Name,Age of Onset)    Diabetes (1) Maternal Grandfather    Prostate Cancer (1) Paternal Grandfather        Past Surgical History:   Procedure Laterality Date     HC OPEN RX CARPAL BONE FX,EACH BONE  01/24/07    Open reduction internal fixation, right hand     NO HISTORY OF SURGERY       Social History     Socioeconomic History     Marital status:      Spouse name: Yasmine     Number of children: 2     Years of education: None     Highest education level: None "   Occupational History     Occupation:      Employer: SELF EMPLOYED     Employer: SELF   Tobacco Use     Smoking status: Former     Packs/day: 0.50     Types: Cigarettes     Smokeless tobacco: Current     Types: Chew     Tobacco comments:     3 tins per wk   Substance and Sexual Activity     Alcohol use: Yes     Comment: rare     Drug use: No     Sexual activity: Yes   Other Topics Concern      Service No     Blood Transfusions No     Caffeine Concern No     Hobby Hazards Yes     Comment: scuba diving     Sleep Concern No     Stress Concern Yes     Weight Concern No     Seat Belt No   Social History Narrative    5/10/23    ENVIRONMENTAL HISTORY: [place] The home is heated with a wood stove. They does not have central air conditioning. The patient's bedroom is furnished with carpeting in bedroom.  Pets inside the house include 0 pets inside. Outside horses and cows. There is no history of cockroach or mice infestation. There is/are 0 smokers in the house.  The house does not have a damp basement.            Review of Systems   Constitutional: Negative for activity change, fatigue and fever.   HENT: Positive for congestion and rhinorrhea. Negative for ear pain, facial swelling, nosebleeds, postnasal drip, sinus pressure and sneezing.    Eyes: Negative for discharge, redness and itching.   Respiratory: Negative for cough, chest tightness, shortness of breath, wheezing and stridor.    Gastrointestinal: Negative for diarrhea, nausea and vomiting.   Skin: Negative for rash.   Allergic/Immunologic: Negative for environmental allergies and food allergies.   Neurological: Negative for headaches.   Hematological: Negative for adenopathy.   Psychiatric/Behavioral: Negative for self-injury.           Current Outpatient Medications:      azelastine (ASTELIN) 0.1 % nasal spray, Spray 2 sprays into both nostrils 2 times daily as needed for rhinitis, Disp: 30 mL, Rfl:  "3     fluticasone (FLONASE) 50 MCG/ACT nasal spray, Spray 1-2 sprays into both nostrils daily, Disp: 16 g, Rfl: 3     ipratropium (ATROVENT) 0.06 % nasal spray, Spray 2 sprays into both nostrils 3 times daily as needed for rhinitis, Disp: 15 mL, Rfl: 3     loratadine-pseudoePHEDrine (CLARITIN-D 24-HOUR)  MG per tablet, Take daily PRN for allergies, Disp: 30 tablet, Rfl: 1     triamcinolone (NASACORT) 55 MCG/ACT nasal aerosol, Spray 2 sprays into both nostrils daily, Disp: , Rfl:   Immunization History   Administered Date(s) Administered     Hepatitis B, Adult 12/29/2010, 02/01/2011, 07/27/2011     Historical DTP/aP 02/14/1973, 03/21/1973, 04/26/1973     OPV, trivalent, live 03/21/1973, 06/01/1973, 04/25/1974     TD,PF 7+ (Tenivac) 01/20/1992, 09/09/2004     TDAP Vaccine (Adacel) 04/28/2019     Td (Adult), Adsorbed 09/09/2004, 07/23/2008     Allergies   Allergen Reactions     Aspirin      Penicillins      OBJECTIVE:                                                                 BP (!) 141/87   Pulse 59   Temp 97.7  F (36.5  C) (Temporal)   Ht 1.803 m (5' 11\")   Wt 86.2 kg (190 lb)   SpO2 100%   BMI 26.50 kg/m          Physical Exam  Vitals and nursing note reviewed.   Constitutional:       General: He is not in acute distress.     Appearance: He is not diaphoretic.   HENT:      Head: Normocephalic and atraumatic.      Right Ear: Tympanic membrane, ear canal and external ear normal.      Left Ear: Tympanic membrane, ear canal and external ear normal.      Nose: Septal deviation (left septal spur) present. No mucosal edema or rhinorrhea.      Right Turbinates: Enlarged (mildly). Not swollen or pale.      Left Turbinates: Enlarged (mildly). Not swollen or pale.      Mouth/Throat:      Lips: Pink.      Mouth: Mucous membranes are moist.      Pharynx: Oropharynx is clear. No pharyngeal swelling, oropharyngeal exudate or posterior oropharyngeal erythema.   Eyes:      General:         Right eye: No discharge.   "       Left eye: No discharge.      Conjunctiva/sclera: Conjunctivae normal.   Cardiovascular:      Rate and Rhythm: Normal rate and regular rhythm.      Heart sounds: Normal heart sounds. No murmur heard.  Pulmonary:      Effort: Pulmonary effort is normal. No respiratory distress.      Breath sounds: Normal breath sounds and air entry. No stridor, decreased air movement or transmitted upper airway sounds. No decreased breath sounds, wheezing, rhonchi or rales.   Neurological:      Mental Status: He is alert and oriented to person, place, and time.   Psychiatric:         Mood and Affect: Mood normal.         Behavior: Behavior normal.           ASSESSMENT/PLAN:    Chronic rhinitis  Apneic episode    Tried and failed a combination of azelastine and intranasal fluticasone.  - Stop azelastine.  -Continue intranasal fluticasone 1-2 sprays in each nostril once daily.  -Start ipratropium bromide 0.06% 2 sprays in each nostril up to 3 times a day as needed.   He will see ENT to be assesed for apneic episdes. He might need SMRT and septoplasty. He will see ENT for that as well.     - ipratropium (ATROVENT) 0.06 % nasal spray  Dispense: 15 mL; Refill: 3  - Adult ENT  Referral      Return in about 10 weeks (around 9/13/2023), or if symptoms worsen or fail to improve.    Thank you for allowing us to participate in the care of this patient. Please feel free to contact us if there are any questions or concerns about the patient.    Disclaimer: This note consists of symbols derived from keyboarding, dictation and/or voice recognition software. As a result, there may be errors in the script that have gone undetected. Please consider this when interpreting information found in this chart.    Cam Toro MD, FAAAAI, FACAAI  Allergy, Asthma and Immunology     [facility]

## 2023-07-05 NOTE — PATIENT INSTRUCTIONS
See ENT.     Continue Flonase 1-2 sprays in each nostril once daily. Stop azelastine.     Start Atrovent 2 sprays in each nostril up to 3 times a day as needed.         Dr Toro Scheduling:  [facility] (Tues / Wed) appointment line: [phone]  [place] allergy shot room: [phone]  [facility] (Thurs / Fri) appointment line: [phone]    Pulmonary Function Scheduling:  [facility] [phone]  [facility] [phone]  [place] - [phone]     Prescription Assistance  If you need assistance with your prescriptions (cost, coverage, etc) please contact: [place] Prescription Assistance [facility] [phone]

## 2023-07-05 NOTE — LETTER
"    7/5/2023         RE: Eric Wilson  [address]  [facility] [address]        Dear Colleague,    Thank you for referring your patient, Eric Wilson, to the [facility]. Please see a copy of my visit note below.    SUBJECTIVE:                                                                   Eric Wilson presents today to our Allergy Clinic at [facility] for a follow up visit. He is a 52 year old male with chronic rhinitis.  History of chronic rhinitis since his 20s.  Perennial pattern without seasonal exacerbations.  In May 2023, SPT for aeroallergens was negative with appropriate responses to positive and negative controls.    His wife reports a couple of apneic episodes at night.   He uses intranasal fluticasone and azelastine 2 sprays in each nostril twice daily. He reports a \"slight\" improvement in nasal congestion. He develops rhinorrhea in the morning, but in 10 minutes, he is fine.   He reports postnasal drip. There is a thick phlegm in his throat. It got better with a combination of azelastine and intranasal fluticasone.       Patient Active Problem List   Diagnosis     Hyperlipidemia LDL goal <130     Pelvic pain in male       Past Medical History:   Diagnosis Date     Atopic rhinitis 10/22/2009     (Problem list name updated by automated process. Provider to review and confirm.)     Closed fracture of hamate (unciform) bone of wrist 1/19/2007     Contusion of hand 1/1/2007     Problem list name updated by automated process. Provider to review     Foreign body in site on external eye 4/21/2007     Problem list name updated by automated process. Provider to review     NO ACTIVE PROBLEMS       Problem (# of Occurrences) Relation (Name,Age of Onset)    Diabetes (1) Maternal Grandfather    Prostate Cancer (1) Paternal Grandfather        Past Surgical History:   Procedure Laterality Date     HC OPEN RX CARPAL BONE FX,EACH BONE  01/24/07    Open reduction " internal fixation, right hand     NO HISTORY OF SURGERY       Social History     Socioeconomic History     Marital status:      Spouse name: Yasmine     Number of children: 2     Years of education: None     Highest education level: None   Occupational History     Occupation:      Employer: SELF EMPLOYED     Employer: SELF   Tobacco Use     Smoking status: Former     Packs/day: 0.50     Types: Cigarettes     Smokeless tobacco: Current     Types: Chew     Tobacco comments:     3 tins per wk   Substance and Sexual Activity     Alcohol use: Yes     Comment: rare     Drug use: No     Sexual activity: Yes   Other Topics Concern      Service No     Blood Transfusions No     Caffeine Concern No     Hobby Hazards Yes     Comment: scuba diving     Sleep Concern No     Stress Concern Yes     Weight Concern No     Seat Belt No   Social History Narrative    5/10/23    ENVIRONMENTAL HISTORY: [place] The home is heated with a wood stove. They does not have central air conditioning. The patient's bedroom is furnished with carpeting in bedroom.  Pets inside the house include 0 pets inside. Outside horses and cows. There is no history of cockroach or mice infestation. There is/are 0 smokers in the house.  The house does not have a damp basement.            Review of Systems   Constitutional: Negative for activity change, fatigue and fever.   HENT: Positive for congestion and rhinorrhea. Negative for ear pain, facial swelling, nosebleeds, postnasal drip, sinus pressure and sneezing.    Eyes: Negative for discharge, redness and itching.   Respiratory: Negative for cough, chest tightness, shortness of breath, wheezing and stridor.    Gastrointestinal: Negative for diarrhea, nausea and vomiting.   Skin: Negative for rash.   Allergic/Immunologic: Negative for environmental allergies and food allergies.   Neurological: Negative for headaches.   Hematological: Negative for  "adenopathy.   Psychiatric/Behavioral: Negative for self-injury.           Current Outpatient Medications:      azelastine (ASTELIN) 0.1 % nasal spray, Spray 2 sprays into both nostrils 2 times daily as needed for rhinitis, Disp: 30 mL, Rfl: 3     fluticasone (FLONASE) 50 MCG/ACT nasal spray, Spray 1-2 sprays into both nostrils daily, Disp: 16 g, Rfl: 3     ipratropium (ATROVENT) 0.06 % nasal spray, Spray 2 sprays into both nostrils 3 times daily as needed for rhinitis, Disp: 15 mL, Rfl: 3     loratadine-pseudoePHEDrine (CLARITIN-D 24-HOUR)  MG per tablet, Take daily PRN for allergies, Disp: 30 tablet, Rfl: 1     triamcinolone (NASACORT) 55 MCG/ACT nasal aerosol, Spray 2 sprays into both nostrils daily, Disp: , Rfl:   Immunization History   Administered Date(s) Administered     Hepatitis B, Adult 12/29/2010, 02/01/2011, 07/27/2011     Historical DTP/aP 02/14/1973, 03/21/1973, 04/26/1973     OPV, trivalent, live 03/21/1973, 06/01/1973, 04/25/1974     TD,PF 7+ (Tenivac) 01/20/1992, 09/09/2004     TDAP Vaccine (Adacel) 04/28/2019     Td (Adult), Adsorbed 09/09/2004, 07/23/2008     Allergies   Allergen Reactions     Aspirin      Penicillins      OBJECTIVE:                                                                 BP (!) 141/87   Pulse 59   Temp 97.7  F (36.5  C) (Temporal)   Ht 1.803 m (5' 11\")   Wt 86.2 kg (190 lb)   SpO2 100%   BMI 26.50 kg/m          Physical Exam  Vitals and nursing note reviewed.   Constitutional:       General: He is not in acute distress.     Appearance: He is not diaphoretic.   HENT:      Head: Normocephalic and atraumatic.      Right Ear: Tympanic membrane, ear canal and external ear normal.      Left Ear: Tympanic membrane, ear canal and external ear normal.      Nose: Septal deviation (left septal spur) present. No mucosal edema or rhinorrhea.      Right Turbinates: Enlarged (mildly). Not swollen or pale.      Left Turbinates: Enlarged (mildly). Not swollen or pale.      " Mouth/Throat:      Lips: Pink.      Mouth: Mucous membranes are moist.      Pharynx: Oropharynx is clear. No pharyngeal swelling, oropharyngeal exudate or posterior oropharyngeal erythema.   Eyes:      General:         Right eye: No discharge.         Left eye: No discharge.      Conjunctiva/sclera: Conjunctivae normal.   Cardiovascular:      Rate and Rhythm: Normal rate and regular rhythm.      Heart sounds: Normal heart sounds. No murmur heard.  Pulmonary:      Effort: Pulmonary effort is normal. No respiratory distress.      Breath sounds: Normal breath sounds and air entry. No stridor, decreased air movement or transmitted upper airway sounds. No decreased breath sounds, wheezing, rhonchi or rales.   Neurological:      Mental Status: He is alert and oriented to person, place, and time.   Psychiatric:         Mood and Affect: Mood normal.         Behavior: Behavior normal.           ASSESSMENT/PLAN:    Chronic rhinitis  Apneic episode    Tried and failed a combination of azelastine and intranasal fluticasone.  - Stop azelastine.  -Continue intranasal fluticasone 1-2 sprays in each nostril once daily.  -Start ipratropium bromide 0.06% 2 sprays in each nostril up to 3 times a day as needed.   He will see ENT to be assesed for apneic episdes. He might need SMRT and septoplasty. He will see ENT for that as well.     - ipratropium (ATROVENT) 0.06 % nasal spray  Dispense: 15 mL; Refill: 3  - Adult ENT  Referral      Return in about 10 weeks (around 9/13/2023), or if symptoms worsen or fail to improve.    Thank you for allowing us to participate in the care of this patient. Please feel free to contact us if there are any questions or concerns about the patient.    Disclaimer: This note consists of symbols derived from keyboarding, dictation and/or voice recognition software. As a result, there may be errors in the script that have gone undetected. Please consider this when interpreting information found in this  chart.    Cam Toro MD, FAAAAI, FACAAI  Allergy, Asthma and Immunology     [facility]        Again, thank you for allowing me to participate in the care of your patient.        Sincerely,        Cam Toro MD

## 2023-08-16 ENCOUNTER — TELEPHONE (OUTPATIENT)
Dept: FAMILY MEDICINE | Facility: CLINIC | Age: 53
End: 2023-08-16
Payer: COMMERCIAL

## 2023-08-16 DIAGNOSIS — J31.0 CHRONIC RHINITIS: ICD-10-CM

## 2023-08-16 RX ORDER — AZELASTINE 1 MG/ML
2 SPRAY, METERED NASAL 2 TIMES DAILY PRN
Qty: 30 ML | Refills: 3 | Status: CANCELLED | OUTPATIENT
Start: 2023-08-16

## 2023-08-16 NOTE — TELEPHONE ENCOUNTER
General Call      Reason for Call:  Would like to go back on Azelastine HCI. The new prescribed nasal spray is not working for him.     What are your questions or concerns:  Would like to go back on Azelastine HCI. The new prescribed nasal spray is not working for him.     Date of last appointment with provider: 7/5/23    Okay to leave a detailed message?: Yes at Cell number on file:    Telephone Information:   Mobile [phone]

## 2023-08-16 NOTE — TELEPHONE ENCOUNTER
RN spoke with patient.  He was switched from Flonase / azelastine to Flonase / ipratropium at his last visit.  He says it is not working for him and would like to go back on Flonase / azelastine combination.  Please send azelastine to [facility] pharmacy.    Joan CONNER, RN   [facility], 8/16/2023 11:04 AM

## 2023-08-17 RX ORDER — AZELASTINE 1 MG/ML
2 SPRAY, METERED NASAL 2 TIMES DAILY PRN
Qty: 30 ML | Refills: 3 | Status: SHIPPED | OUTPATIENT
Start: 2023-08-17

## 2023-08-25 ENCOUNTER — TELEPHONE (OUTPATIENT)
Dept: OTOLARYNGOLOGY | Facility: CLINIC | Age: 53
End: 2023-08-25
Payer: COMMERCIAL

## 2023-08-25 NOTE — TELEPHONE ENCOUNTER
[facility] Call Center    Phone Message    May a detailed message be left on voicemail: yes     Reason for Call: Other: Pt said he is supposed to come in to see Dr. Yusuf to have a scope put up in nose - did not see any specific orders regarding this, please call him to discuss further/place orders, thanks     Action Taken: Other: ENT    Travel Screening: Not Applicable

## 2023-08-25 NOTE — TELEPHONE ENCOUNTER
Constantly stuffed up and has post-nasal drainage. Per patient, he has a slightly deviated septum. More difficult to take a deep breath through right nostril.    Scheduled apt in January 2024.    Dorcas Vega RN on 8/25/2023 at 4:04 PM

## 2023-08-26 ENCOUNTER — TELEPHONE (OUTPATIENT)
Dept: FAMILY MEDICINE | Facility: CLINIC | Age: 53
End: 2023-08-26
Payer: COMMERCIAL

## 2023-08-28 NOTE — TELEPHONE ENCOUNTER
Please advise on patients referral request to [facility]. Pt was referred back ENT but first available is in Jan. Please advise if patient should be worked in sooner with Dr. Yusuf or if you are willing to give referral to [place]. Char Gaines, [facility]

## 2023-08-29 NOTE — TELEPHONE ENCOUNTER
LM for patient to call clinic back. There is an opening on 11/27/23 in [place] at 1:30. [facility] is on hold but not scheduled. Will wait for call back to confirm if it will work for patient. Otherwise can schedule with new provider Dr. Matute in [place] on a Thursday, his schedule is open starting in October.

## 2023-08-30 NOTE — TELEPHONE ENCOUNTER
Called and spoke with patient, scheduled with Dr. Yusuf for 11/27/23 at 1:30pm.     Korina Villanueva RN   [facility]

## 2023-09-01 ENCOUNTER — HOSPITAL ENCOUNTER (EMERGENCY)
Facility: CLINIC | Age: 53
Discharge: HOME | End: 2023-09-01
Attending: FAMILY MEDICINE | Admitting: FAMILY MEDICINE
Payer: COMMERCIAL

## 2023-09-01 VITALS — TEMPERATURE: 97.7 F | HEART RATE: 50 BPM | OXYGEN SATURATION: 99 % | RESPIRATION RATE: 18 BRPM

## 2023-09-01 DIAGNOSIS — J32.9 CHRONIC RHINOSINUSITIS: ICD-10-CM

## 2023-09-01 PROCEDURE — 99284 EMERGENCY DEPT VISIT MOD MDM: CPT | Performed by: FAMILY MEDICINE

## 2023-09-01 PROCEDURE — 99283 EMERGENCY DEPT VISIT LOW MDM: CPT | Performed by: FAMILY MEDICINE

## 2023-09-01 RX ORDER — PREDNISONE 20 MG/1
40 TABLET ORAL DAILY
Qty: 10 TABLET | Refills: 0 | Status: SHIPPED | OUTPATIENT
Start: 2023-09-01 | End: 2023-09-06

## 2023-09-01 RX ORDER — CEFDINIR 300 MG/1
300 CAPSULE ORAL 2 TIMES DAILY
Qty: 20 CAPSULE | Refills: 0 | Status: SHIPPED | OUTPATIENT
Start: 2023-09-01

## 2023-09-01 ASSESSMENT — ACTIVITIES OF DAILY LIVING (ADL): ADLS_ACUITY_SCORE: 35

## 2023-09-01 NOTE — ED TRIAGE NOTES
"     Triage Assessment       Row Name 09/01/23 0236       Triage Assessment (Adult)    Airway WDL WDL       Respiratory WDL    Respiratory WDL WDL                  Complains of \"something wrong with my sinuses\".  States he has taken multiple sinus sprays and over the counter decongestants without relief.   "

## 2023-09-01 NOTE — ED PROVIDER NOTES
History     Chief Complaint   Patient presents with    Nasal Congestion     HPI  Eric Wilson is a 52 year old male who presents to the emergency department with not being able to breathe through his nose.  Patient has been having chronic sinus and nasal issues for more than a year.  Patient is seen ear nose and throat and an allergist recently.  Patient is on multiple nasal sprays.  Patient states over the last month he has been having increasing drainage down the back of his throat in the last 4 days it seems like its been worse.  He states that he wakes up every night because his nose gets completely plugged.  He just could not deal with it anymore tonight.  Denies any recent fevers or chills.  Denies any teeth pain or jaw pain.  Patient states that the drainage is mostly on the right side of his throat.    Allergies:  Allergies   Allergen Reactions    Aspirin     Penicillins        Problem List:    Patient Active Problem List    Diagnosis Date Noted    Hyperlipidemia LDL goal <130 07/31/2017     Priority: Medium    Pelvic pain in male 07/31/2017     Priority: Medium        Past Medical History:    Past Medical History:   Diagnosis Date    Atopic rhinitis 10/22/2009    Closed fracture of hamate (unciform) bone of wrist 1/19/2007    Contusion of hand 1/1/2007    Foreign body in site on external eye 4/21/2007    NO ACTIVE PROBLEMS        Past Surgical History:    Past Surgical History:   Procedure Laterality Date    HC OPEN RX CARPAL BONE FX,EACH BONE  01/24/07    Open reduction internal fixation, right hand    NO HISTORY OF SURGERY         Family History:    Family History   Problem Relation Age of Onset    Diabetes Maternal Grandfather     Prostate Cancer Paternal Grandfather        Social History:  Marital Status:   [2]  Social History     Tobacco Use    Smoking status: Former     Packs/day: 0.50     Types: Cigarettes    Smokeless tobacco: Current     Types: Chew    Tobacco comments:     3 tins per wk    Substance Use Topics    Alcohol use: Yes     Comment: rare    Drug use: No        Medications:    cefdinir (OMNICEF) 300 MG capsule  predniSONE (DELTASONE) 20 MG tablet  azelastine (ASTELIN) 0.1 % nasal spray  azelastine (ASTELIN) 0.1 % nasal spray  fluticasone (FLONASE) 50 MCG/ACT nasal spray  loratadine-pseudoePHEDrine (CLARITIN-D 24-HOUR)  MG per tablet  triamcinolone (NASACORT) 55 MCG/ACT nasal aerosol          Review of Systems   All other systems reviewed and are negative.      Physical Exam   Pulse: 50  Temp: 97.7  F (36.5  C)  Resp: 18  SpO2: 99 %      Physical Exam  Vitals and nursing note reviewed.   Constitutional:       General: He is not in acute distress.     Appearance: Normal appearance. He is not ill-appearing.   HENT:      Head: Normocephalic and atraumatic.      Right Ear: Tympanic membrane normal.      Left Ear: Tympanic membrane normal.      Nose: No nasal deformity, septal deviation, signs of injury, nasal tenderness, mucosal edema or congestion.      Right Turbinates: Not enlarged.      Left Turbinates: Not enlarged.      Right Sinus: No maxillary sinus tenderness or frontal sinus tenderness.      Left Sinus: No maxillary sinus tenderness or frontal sinus tenderness.      Mouth/Throat:      Pharynx: Posterior oropharyngeal erythema present. No pharyngeal swelling or oropharyngeal exudate.      Tonsils: No tonsillar exudate.   Skin:     Capillary Refill: Capillary refill takes less than 2 seconds.   Neurological:      Mental Status: He is alert.         ED Course                 Procedures           No results found for this or any previous visit (from the past 24 hour(s)).    Medications - No data to display    This is a 52-year-old male who has had longstanding nasal congestion comes in tonight because in the last 4 days he has been waking up at night with his nose completely plugged.  Patient has been on multiple medications including multiple nasal sprays with no effect.  Patient  has never been on a round of antibiotics or steroids for his symptoms.  His allergist had mentioned possibly needing a septoplasty which he was supposed to meet with ear nose and throat about but he has not set anything up yet.  At this point I am not sure really what I can offer him for symptoms been going on so long.  With the symptoms getting worse, there could be an superseding sinus infection or some other inflammatory causes make the symptoms get worse.  I think it be reasonable to try a round of antibiotics and some oral steroids to see if this may be helps with the symptoms.  I strongly encouraged him to follow-up with his ear nose and throat doctor to discuss surgical options for his symptoms.  In the meantime we will try a course of amoxicillin and oral prednisone.  Patient will be discharged at this time.      Assessments & Plan (with Medical Decision Making)  Chronic rhinosinusitis     I have reviewed the nursing notes.    I have reviewed the findings, diagnosis, plan and need for follow up with the patient.        New Prescriptions    CEFDINIR (OMNICEF) 300 MG CAPSULE    Take 1 capsule (300 mg) by mouth 2 times daily    PREDNISONE (DELTASONE) 20 MG TABLET    Take 2 tablets (40 mg) by mouth daily for 5 days       Final diagnoses:   Chronic rhinosinusitis       9/1/2023   [facility] EMERGENCY DEPT       Jose Beckman MD  09/01/23 6848

## 2023-11-21 NOTE — PROGRESS NOTES
History of Present Illness - Eric Wilson is a 52 year old male presenting in clinic today for a recheck on Patient presents with:  Follow Up: Chronic rhinitis   In May 2023, SPT for aeroallergens was negative with appropriate responses to positive and negative controls.   Patient was being seen by allergy specialist regarding his rhinitis.  Allergy testing essentially was negative.  Therefore he was started on ipratropium bromide and continued on Flonase.  Those did not seem to help that much.  He continues to have some difficulty with nasal congestion and some clear postnasal rhinorrhea.  Sense of smell even though somewhat diminished for many years but sense of taste appears to be intact.  No other stigmata of sinusitis noted.      BP Readings from Last 1 Encounters:   11/27/23 120/72       BP noted to be well controlled today in office.     Eric IS NOT a smoker/uses chewing tobacco.  Eric already quit      Past Medical History -   Past Medical History:   Diagnosis Date    Atopic rhinitis 10/22/2009     (Problem list name updated by automated process. Provider to review and confirm.)    Closed fracture of hamate (unciform) bone of wrist 1/19/2007    Contusion of hand 1/1/2007     Problem list name updated by automated process. Provider to review    Foreign body in site on external eye 4/21/2007     Problem list name updated by automated process. Provider to review    NO ACTIVE PROBLEMS        Current Medications -   Current Outpatient Medications:     azelastine (ASTELIN) 0.1 % nasal spray, Spray 2 sprays into both nostrils 2 times daily as needed for rhinitis, Disp: 30 mL, Rfl: 3    azelastine (ASTELIN) 0.1 % nasal spray, Spray 2 sprays into both nostrils 2 times daily as needed for rhinitis, Disp: 30 mL, Rfl: 3    fluticasone (FLONASE) 50 MCG/ACT nasal spray, Spray 1-2 sprays into both nostrils daily, Disp: 16 g, Rfl: 3    loratadine-pseudoePHEDrine (CLARITIN-D 24-HOUR)  MG per tablet, Take daily PRN for  allergies, Disp: 30 tablet, Rfl: 1    triamcinolone (NASACORT) 55 MCG/ACT nasal aerosol, Spray 2 sprays into both nostrils daily, Disp: , Rfl:     cefdinir (OMNICEF) 300 MG capsule, Take 1 capsule (300 mg) by mouth 2 times daily (Patient not taking: Reported on 11/27/2023), Disp: 20 capsule, Rfl: 0    Allergies -   Allergies   Allergen Reactions    Aspirin     Penicillins        Social History -   Social History     Socioeconomic History    Marital status:      Spouse name: Yasmine    Number of children: 2   Occupational History    Occupation:      Employer: SELF EMPLOYED     Employer: SELF   Tobacco Use    Smoking status: Former     Packs/day: .5     Types: Cigarettes    Smokeless tobacco: Current     Types: Chew    Tobacco comments:     3 tins per wk   Substance and Sexual Activity    Alcohol use: Yes     Comment: rare    Drug use: No    Sexual activity: Yes   Other Topics Concern     Service No    Blood Transfusions No    Caffeine Concern No    Hobby Hazards Yes     Comment: scuba diving    Sleep Concern No    Stress Concern Yes    Weight Concern No    Seat Belt No   Social History Narrative    5/10/23    ENVIRONMENTAL HISTORY: [place] The home is heated with a wood stove. They does not have central air conditioning. The patient's bedroom is furnished with carpeting in bedroom.  Pets inside the house include 0 pets inside. Outside horses and cows. There is no history of cockroach or mice infestation. There is/are 0 smokers in the house.  The house does not have a damp basement.        Family History -   Family History   Problem Relation Age of Onset    Diabetes Maternal Grandfather     Prostate Cancer Paternal Grandfather        Review of Systems - As per HPI and PMHx, otherwise review of system review of the head and neck negative. Otherwise 10+ review of system is negative    Physical Exam  /72   Temp 97.6  F (36.4  C) (Temporal)   Ht  "1.803 m (5' 11\")   Wt 92.4 kg (203 lb 9.6 oz)   BMI 28.40 kg/m    BMI: Body mass index is 28.4 kg/m .    General - The patient is well nourished and well developed, and appears to have good nutritional status.  Alert and oriented to person and place, answers questions and cooperates with examination appropriately.    SKIN - No suspicious lesions or rashes.  Respiration - No respiratory distress.  Head and Face - Normocephalic and atraumatic, with no gross asymmetry noted of the contour of the facial features.  The facial nerve is intact, with strong symmetric movements.    Voice and Breathing - The patient was breathing comfortably without the use of accessory muscles. The patients voice was clear and strong, and had appropriate pitch and quality.    Ears - Bilateral pinna and EACs with normal appearing overlying skin. Tympanic membrane intact with good mobility on pneumatic otoscopy bilaterally. Bony landmarks of the ossicular chain are normal. The tympanic membranes are normal in appearance. No retraction, perforation, or masses.  No fluid or purulence was seen in the external canal or the middle ear.     Eyes - Extraocular movements intact.  Sclera were not icteric or injected, conjunctiva were pink and moist.    Mouth - Examination of the oral cavity showed pink, healthy oral mucosa. No lesions or ulcerations noted.  The tongue was mobile and midline, and the dentition were in good condition.      Throat - The walls of the oropharynx were smooth, pink, moist, symmetric, and had no lesions or ulcerations.  The tonsillar pillars and soft palate were symmetric. The uvula was midline on elevation.    Neck - Normal midline excursion of the laryngotracheal complex during swallowing.  Full range of motion on passive movement.  Palpation of the occipital, submental, submandibular, internal jugular chain, and supraclavicular nodes did not demonstrate any abnormal lymph nodes or masses.  The carotid pulse was palpable " bilaterally.  Palpation of the thyroid was soft and smooth, with no nodules or goiter appreciated.  The trachea was mobile and midline.    Nose - External contour is symmetric, no gross deflection or scars.  Nasal mucosa is pink and moist with no abnormal mucus.  The septum was deviated to the right and partially obstructive, turbinates of enlarged size and position.  No polyps, masses, or purulence noted on examination.    Neuro - Nonfocal neuro exam is normal, CN 2 through 12 intact, normal gait and muscle tone.      Performed in clinic today:  Decongestion with Chet-Synephrine today did indeed improve his nasal breathing especially on the left but on the right was also improved.      A/P - Eric Wilson is a 52 year old male Patient presents with:  Follow Up: Chronic rhinitis    Patient with nasal obstruction congestion mostly due to deviated nasal septum and enlarged inferior turbinates and some other accompanying symptoms likely due to that as well.  We discussed different options potentially addressing the septum with a septoplasty and turbinate reduction via submucosal resection of turbinates.  We also with talking about more conservative option of turbinate reduction which will improve nasal breathing especially on the left but certainly right may still be somewhat restricted may reduce secretions as well.  He is opting for the latter more conservative office-based option.  We discussed the risks of scabbing bleeding and he was to go ahead with the procedure to be done under local anesthetic.        Merritt Yusuf MD

## 2023-11-27 ENCOUNTER — OFFICE VISIT (OUTPATIENT)
Dept: OTOLARYNGOLOGY | Facility: CLINIC | Age: 53
End: 2023-11-27
Payer: COMMERCIAL

## 2023-11-27 VITALS
TEMPERATURE: 97.6 F | WEIGHT: 203.6 LBS | BODY MASS INDEX: 28.5 KG/M2 | HEIGHT: 71 IN | SYSTOLIC BLOOD PRESSURE: 120 MMHG | DIASTOLIC BLOOD PRESSURE: 72 MMHG

## 2023-11-27 DIAGNOSIS — J34.3 HYPERTROPHY OF BOTH INFERIOR NASAL TURBINATES: ICD-10-CM

## 2023-11-27 DIAGNOSIS — J31.0 CHRONIC RHINITIS: ICD-10-CM

## 2023-11-27 DIAGNOSIS — J34.2 DEVIATED NASAL SEPTUM: Primary | ICD-10-CM

## 2023-11-27 PROCEDURE — 99214 OFFICE O/P EST MOD 30 MIN: CPT | Performed by: OTOLARYNGOLOGY

## 2023-12-04 ENCOUNTER — TELEPHONE (OUTPATIENT)
Dept: OTOLARYNGOLOGY | Facility: CLINIC | Age: 53
End: 2023-12-04
Payer: COMMERCIAL

## 2023-12-04 NOTE — TELEPHONE ENCOUNTER
[facility] has nothing to do with [facility]. This patient will need to call them for an appointment there.    Ilda Pennington RN on 12/4/2023 at 11:15 AM

## 2023-12-04 NOTE — TELEPHONE ENCOUNTER
Reason for Call:  Other appointment    Detailed comments: patient calling because he was supposed to get a call back about getting an appointment in [place] on 12/20? He says he discussed this with Dr. Yusuf on his last visit. Please call him     Phone Number Patient can be reached at: Home number on file [phone] (home)    Best Time: any      Can we leave a detailed message on this number? YES    Call taken on 12/4/2023 at 11:09 AM by Dorcas Jenkins

## 2023-12-04 NOTE — TELEPHONE ENCOUNTER
This one was a unique case as he needed to be worked in to that clinic on 12/20. Patient was told by Dr. Yusuf during his last appointment that Dr. Yusuf would talk with team there and have them call patient. He wasn't given a phone number or a way to get a hold of them?

## 2023-12-27 NOTE — TELEPHONE ENCOUNTER
Patient reports having the visit in [place] 12/20/23. Concern addressed so closing this encounter.    Dorcas Vega RN on 12/27/2023 at 10:48 AM

## 2025-01-16 NOTE — PROGRESS NOTES
"History of Present Illness - Eric Wilson is a 54 year old male presenting in clinic today for a recheck on Patient presents with:  Follow Up    ***    Present Symptoms include: {ENT ASSOCIATED SX: 710748} and they are   {ENT SEVERITY STANDIN} .  Eric denies {ENT ASSOCIATED SX: 558494}.      There is no height or weight on file to calculate BMI.    {Weight Management Plan -- Delete if patient has a normal BMI:341873}    BP Readings from Last 1 Encounters:   23 120/72       {htnspecialty:664957}    Eric {IS:248838} a smoker/uses chewing tobacco.  Eric {QUITTIN::\"is not ready to quit\"}      Past Medical History -   Past Medical History:   Diagnosis Date    Atopic rhinitis 10/22/2009     (Problem list name updated by automated process. Provider to review and confirm.)    Closed fracture of hamate (unciform) bone of wrist 2007    Contusion of hand 2007     Problem list name updated by automated process. Provider to review    Foreign body in site on external eye 2007     Problem list name updated by automated process. Provider to review    NO ACTIVE PROBLEMS        Current Medications -   Current Outpatient Medications:     azelastine (ASTELIN) 0.1 % nasal spray, Spray 2 sprays into both nostrils 2 times daily as needed for rhinitis, Disp: 30 mL, Rfl: 3    azelastine (ASTELIN) 0.1 % nasal spray, Spray 2 sprays into both nostrils 2 times daily as needed for rhinitis, Disp: 30 mL, Rfl: 3    cefdinir (OMNICEF) 300 MG capsule, Take 1 capsule (300 mg) by mouth 2 times daily (Patient not taking: Reported on 2023), Disp: 20 capsule, Rfl: 0    fluticasone (FLONASE) 50 MCG/ACT nasal spray, Spray 1-2 sprays into both nostrils daily, Disp: 16 g, Rfl: 3    loratadine-pseudoePHEDrine (CLARITIN-D 24-HOUR)  MG per tablet, Take daily PRN for allergies, Disp: 30 tablet, Rfl: 1    triamcinolone (NASACORT) 55 MCG/ACT nasal aerosol, Spray 2 sprays into both nostrils daily, Disp: , Rfl: "     Allergies -   Allergies   Allergen Reactions    Aspirin     Penicillins        Social History -   Social History     Socioeconomic History    Marital status:      Spouse name: Yasmine    Number of children: 2   Occupational History    Occupation:      Employer: SELF EMPLOYED     Employer: SELF   Tobacco Use    Smoking status: Former     Current packs/day: 0.50     Types: Cigarettes    Smokeless tobacco: Current     Types: Chew    Tobacco comments:     3 tins per wk   Substance and Sexual Activity    Alcohol use: Yes     Comment: rare    Drug use: No    Sexual activity: Yes   Other Topics Concern     Service No    Blood Transfusions No    Caffeine Concern No    Hobby Hazards Yes     Comment: scuba diving    Sleep Concern No    Stress Concern Yes    Weight Concern No    Seat Belt No   Social History Narrative    5/10/23    ENVIRONMENTAL HISTORY: [place] The home is heated with a wood stove. They does not have central air conditioning. The patient's bedroom is furnished with carpeting in bedroom.  Pets inside the house include 0 pets inside. Outside horses and cows. There is no history of cockroach or mice infestation. There is/are 0 smokers in the house.  The house does not have a damp basement.        Family History -   Family History   Problem Relation Age of Onset    Diabetes Maternal Grandfather     Prostate Cancer Paternal Grandfather        Review of Systems - As per HPI and PMHx, otherwise review of system review of the head and neck negative. Otherwise 10+ review of system is negative    Physical Exam  There were no vitals taken for this visit.  BMI: There is no height or weight on file to calculate BMI.    General - The patient is well nourished and well developed, and appears to have good nutritional status.  Alert and oriented to person and place, answers questions and cooperates with examination appropriately.    SKIN - No suspicious  lesions or rashes.  Respiration - No respiratory distress.  Head and Face - Normocephalic and atraumatic, with no gross asymmetry noted of the contour of the facial features.  The facial nerve is intact, with strong symmetric movements.    Voice and Breathing - The patient was breathing comfortably without the use of accessory muscles. The patients voice was clear and strong, and had appropriate pitch and quality.    Ears - Bilateral pinna and EACs with normal appearing overlying skin. Tympanic membrane intact with good mobility on pneumatic otoscopy bilaterally. Bony landmarks of the ossicular chain are normal. The tympanic membranes are normal in appearance. No retraction, perforation, or masses.  No fluid or purulence was seen in the external canal or the middle ear.     Eyes - Extraocular movements intact.  Sclera were not icteric or injected, conjunctiva were pink and moist.    Mouth - Examination of the oral cavity showed pink, healthy oral mucosa. No lesions or ulcerations noted.  The tongue was mobile and midline, and the dentition were in good condition.      Throat - The walls of the oropharynx were smooth, pink, moist, symmetric, and had no lesions or ulcerations.  The tonsillar pillars and soft palate were symmetric. Tonsils are {ENT TONSILS: 120883}. The uvula was midline on elevation.    Neck - Normal midline excursion of the laryngotracheal complex during swallowing.  Full range of motion on passive movement.  Palpation of the occipital, submental, submandibular, internal jugular chain, and supraclavicular nodes did not demonstrate any abnormal lymph nodes or masses.  The carotid pulse was palpable bilaterally.  Palpation of the thyroid was soft and smooth, with no nodules or goiter appreciated.  The trachea was mobile and midline.    Nose - External contour is symmetric, no gross deflection or scars.  Nasal mucosa is pink and moist with no abnormal mucus.  The septum was midline and non-obstructive,  turbinates of normal size and position.  No polyps, masses, or purulence noted on examination.    Neuro - Nonfocal neuro exam is normal, CN 2 through 12 intact, normal gait and muscle tone.      Performed in clinic today:  [facility]      A/P - Eric Wilson is a 54 year old male Patient presents with:  Follow Up    ***    Eric should follow up {FOLLOW UP:073115}.      At Eric next appointment they {WILL: 563006} need a hearing test.      Merritt Yusuf MD

## 2025-01-27 NOTE — PROGRESS NOTES
History of Present Illness - Eric Wilson is a 54 year old male presenting in clinic today for a recheck on Patient presents with:  Follow Up: Nasal congestion, deviated septum    Patient had turbinate reduction SMR of turbinates done 1 year and few months ago.  Initially was breathing much better.  Also stopped using nasal sprays for secretions.  However in the last few months some the symptoms of increasing drainage even though not as bad as previously and more nasal congestion and obstruction on both sides more so on the right than the left has surfaced.  He denies any stigmata of sinusitis.        BP Readings from Last 1 Encounters:   02/03/25 124/82       BP noted to be well controlled today in office.     Eric IS a smoker/uses chewing tobacco.  Eric is ready to quit      Past Medical History -   Past Medical History:   Diagnosis Date    Atopic rhinitis 10/22/2009     (Problem list name updated by automated process. Provider to review and confirm.)    Closed fracture of hamate (unciform) bone of wrist 1/19/2007    Contusion of hand 1/1/2007     Problem list name updated by automated process. Provider to review    Foreign body in site on external eye 4/21/2007     Problem list name updated by automated process. Provider to review    NO ACTIVE PROBLEMS        Current Medications -   Current Outpatient Medications:     azelastine (ASTELIN) 0.1 % nasal spray, Spray 2 sprays into both nostrils 2 times daily as needed for rhinitis, Disp: 30 mL, Rfl: 3    azelastine (ASTELIN) 0.1 % nasal spray, Spray 2 sprays into both nostrils 2 times daily as needed for rhinitis, Disp: 30 mL, Rfl: 3    fluticasone (FLONASE) 50 MCG/ACT nasal spray, Spray 1-2 sprays into both nostrils daily, Disp: 16 g, Rfl: 3    triamcinolone (NASACORT) 55 MCG/ACT nasal aerosol, Spray 2 sprays into both nostrils daily (Patient not taking: Reported on 2/3/2025), Disp: , Rfl:     Allergies -   Allergies   Allergen Reactions    Aspirin     Penicillins   "      Social History -   Social History     Socioeconomic History    Marital status:      Spouse name: Yasmine    Number of children: 2   Occupational History    Occupation:      Employer: SELF EMPLOYED     Employer: SELF   Tobacco Use    Smoking status: Former     Current packs/day: 0.50     Types: Cigarettes    Smokeless tobacco: Current     Types: Chew    Tobacco comments:     3 tins per wk   Substance and Sexual Activity    Alcohol use: Yes     Comment: rare    Drug use: No    Sexual activity: Yes   Other Topics Concern     Service No    Blood Transfusions No    Caffeine Concern No    Hobby Hazards Yes     Comment: scuba diving    Sleep Concern No    Stress Concern Yes    Weight Concern No    Seat Belt No   Social History Narrative    5/10/23    ENVIRONMENTAL HISTORY: [place] The home is heated with a wood stove. They does not have central air conditioning. The patient's bedroom is furnished with carpeting in bedroom.  Pets inside the house include 0 pets inside. Outside horses and cows. There is no history of cockroach or mice infestation. There is/are 0 smokers in the house.  The house does not have a damp basement.        Family History -   Family History   Problem Relation Age of Onset    Diabetes Maternal Grandfather     Prostate Cancer Paternal Grandfather        Review of Systems - As per HPI and PMHx, otherwise review of system review of the head and neck negative. Otherwise 10+ review of system is negative    Physical Exam  /82 (BP Location: Right arm, Patient Position: Sitting, Cuff Size: Adult Large)   Pulse 74   Temp 97.3  F (36.3  C) (Temporal)   Ht 1.803 m (5' 11\")   Wt 90.7 kg (200 lb)   SpO2 98%   BMI 27.89 kg/m    BMI: Body mass index is 27.89 kg/m .    General - The patient is well nourished and well developed, and appears to have good nutritional status.  Alert and oriented to person and place, answers questions and " cooperates with examination appropriately.    SKIN - No suspicious lesions or rashes.  Respiration - No respiratory distress.  Head and Face - Normocephalic and atraumatic, with no gross asymmetry noted of the contour of the facial features.  The facial nerve is intact, with strong symmetric movements.    Voice and Breathing - The patient was breathing comfortably without the use of accessory muscles. The patients voice was clear and strong, and had appropriate pitch and quality.    Ears - Bilateral pinna and EACs with normal appearing overlying skin. Tympanic membrane intact with good mobility on pneumatic otoscopy bilaterally. Bony landmarks of the ossicular chain are normal. The tympanic membranes are normal in appearance. No retraction, perforation, or masses.  No fluid or purulence was seen in the external canal or the middle ear.     Eyes - Extraocular movements intact.  Sclera were not icteric or injected, conjunctiva were pink and moist.    Mouth - Examination of the oral cavity showed pink, healthy oral mucosa. No lesions or ulcerations noted.  The tongue was mobile and midline, and the dentition were in good condition.      Throat - The walls of the oropharynx were smooth, pink, moist, symmetric, and had no lesions or ulcerations.  The tonsillar pillars and soft palate were symmetric.  The uvula was midline on elevation.    Neck - Normal midline excursion of the laryngotracheal complex during swallowing.  Full range of motion on passive movement.  Palpation of the occipital, submental, submandibular, internal jugular chain, and supraclavicular nodes did not demonstrate any abnormal lymph nodes or masses.  The carotid pulse was palpable bilaterally.  Palpation of the thyroid was soft and smooth, with no nodules or goiter appreciated.  The trachea was mobile and midline.    Nose - External contour is slightly asymmetric, no gross deflection or scars but certainly less prominent contour of the upper lateral  cartilage on the left versus the right.  Nasal mucosa is pink and moist with no abnormal mucus.  The septum was deviated to the right and somewhat obstructive, turbinates of slightly enlarged size and position.  No polyps, masses, or purulence noted on examination.  [place] maneuver significant improved breathing on both sides.  There was definitely internal nasal valve collapse bilaterally more prominent in the left than the right side.  This is likely due to insufficient to support from the upper lateral cartilage.    Neuro - Nonfocal neuro exam is normal, CN 2 through 12 intact, normal gait and muscle tone.            A/P - Eric Wilson is a 54 year old male Patient presents with:  Follow Up: Nasal congestion, deviated septum    Patient with nasal deformity due to poor nasal valve support internally especially on the left side as well as deviated septum and still slightly enlarged turbinates.  He initially benefited from turbinate reduction but symptoms are coming back.  We discussed different options at this point and he wants to consider septoplasty submucous resection of turbinate revision as well as bilateral internal nasal valve repair with batten grafts.We discuss risks and possible complication of septoplasty including septal perforation, bleeding(that may require packing), infection, loss of smell, stenosis, CSF rhinorrhea.  He also understands the cosmetic changes he may experience with batten grafts.  With this knowledge the patient wishes to consider  the surgery.  He will get back in touch with us regarding his decision.    Merritt Yusuf MD

## 2025-01-30 ENCOUNTER — OFFICE VISIT (OUTPATIENT)
Dept: OTOLARYNGOLOGY | Facility: CLINIC | Age: 55
End: 2025-01-30
Payer: COMMERCIAL

## 2025-02-03 ENCOUNTER — MYC MEDICAL ADVICE (OUTPATIENT)
Dept: OTOLARYNGOLOGY | Facility: CLINIC | Age: 55
End: 2025-02-03

## 2025-02-03 ENCOUNTER — OFFICE VISIT (OUTPATIENT)
Dept: OTOLARYNGOLOGY | Facility: CLINIC | Age: 55
End: 2025-02-03
Payer: COMMERCIAL

## 2025-02-03 VITALS
WEIGHT: 200 LBS | HEART RATE: 74 BPM | OXYGEN SATURATION: 98 % | HEIGHT: 71 IN | TEMPERATURE: 97.3 F | SYSTOLIC BLOOD PRESSURE: 124 MMHG | BODY MASS INDEX: 28 KG/M2 | DIASTOLIC BLOOD PRESSURE: 82 MMHG

## 2025-02-03 DIAGNOSIS — J34.2 DEVIATED NASAL SEPTUM: Primary | ICD-10-CM

## 2025-02-03 DIAGNOSIS — J34.3 HYPERTROPHY OF BOTH INFERIOR NASAL TURBINATES: ICD-10-CM

## 2025-02-03 DIAGNOSIS — J34.829 NASAL VALVE COLLAPSE: ICD-10-CM

## 2025-02-03 PROCEDURE — 99214 OFFICE O/P EST MOD 30 MIN: CPT | Performed by: OTOLARYNGOLOGY

## 2025-02-03 ASSESSMENT — PAIN SCALES - GENERAL: PAINLEVEL_OUTOF10: NO PAIN (0)

## 2025-02-03 NOTE — CONFIDENTIAL NOTE
Dr. Yusuf spoke with patient over the phone and suggested patient get opinion from from [facility] or [facility] vs going to [facility]. Char Gaines, [facility]

## 2025-02-03 NOTE — LETTER
2/3/2025      Eric Wilson  [address]  [facility] [address]      Dear Colleague,    Thank you for referring your patient, Eric Wilson, to the [facility]. Please see a copy of my visit note below.    History of Present Illness - Eric Wilson is a 54 year old male presenting in clinic today for a recheck on Patient presents with:  Follow Up: Nasal congestion, deviated septum    Patient had turbinate reduction SMR of turbinates done 1 year and few months ago.  Initially was breathing much better.  Also stopped using nasal sprays for secretions.  However in the last few months some the symptoms of increasing drainage even though not as bad as previously and more nasal congestion and obstruction on both sides more so on the right than the left has surfaced.  He denies any stigmata of sinusitis.        BP Readings from Last 1 Encounters:   02/03/25 124/82       BP noted to be well controlled today in office.     Eric IS a smoker/uses chewing tobacco.  Eric is ready to quit      Past Medical History -   Past Medical History:   Diagnosis Date     Atopic rhinitis 10/22/2009     (Problem list name updated by automated process. Provider to review and confirm.)     Closed fracture of hamate (unciform) bone of wrist 1/19/2007     Contusion of hand 1/1/2007     Problem list name updated by automated process. Provider to review     Foreign body in site on external eye 4/21/2007     Problem list name updated by automated process. Provider to review     NO ACTIVE PROBLEMS        Current Medications -   Current Outpatient Medications:      azelastine (ASTELIN) 0.1 % nasal spray, Spray 2 sprays into both nostrils 2 times daily as needed for rhinitis, Disp: 30 mL, Rfl: 3     azelastine (ASTELIN) 0.1 % nasal spray, Spray 2 sprays into both nostrils 2 times daily as needed for rhinitis, Disp: 30 mL, Rfl: 3     fluticasone (FLONASE) 50 MCG/ACT nasal spray, Spray 1-2 sprays into both nostrils daily, Disp: 16  "g, Rfl: 3     triamcinolone (NASACORT) 55 MCG/ACT nasal aerosol, Spray 2 sprays into both nostrils daily (Patient not taking: Reported on 2/3/2025), Disp: , Rfl:     Allergies -   Allergies   Allergen Reactions     Aspirin      Penicillins        Social History -   Social History     Socioeconomic History     Marital status:      Spouse name: Yasmine     Number of children: 2   Occupational History     Occupation:      Employer: SELF EMPLOYED     Employer: SELF   Tobacco Use     Smoking status: Former     Current packs/day: 0.50     Types: Cigarettes     Smokeless tobacco: Current     Types: Chew     Tobacco comments:     3 tins per wk   Substance and Sexual Activity     Alcohol use: Yes     Comment: rare     Drug use: No     Sexual activity: Yes   Other Topics Concern      Service No     Blood Transfusions No     Caffeine Concern No     Hobby Hazards Yes     Comment: scuba diving     Sleep Concern No     Stress Concern Yes     Weight Concern No     Seat Belt No   Social History Narrative    5/10/23    ENVIRONMENTAL HISTORY: [place] The home is heated with a wood stove. They does not have central air conditioning. The patient's bedroom is furnished with carpeting in bedroom.  Pets inside the house include 0 pets inside. Outside horses and cows. There is no history of cockroach or mice infestation. There is/are 0 smokers in the house.  The house does not have a damp basement.        Family History -   Family History   Problem Relation Age of Onset     Diabetes Maternal Grandfather      Prostate Cancer Paternal Grandfather        Review of Systems - As per HPI and PMHx, otherwise review of system review of the head and neck negative. Otherwise 10+ review of system is negative    Physical Exam  /82 (BP Location: Right arm, Patient Position: Sitting, Cuff Size: Adult Large)   Pulse 74   Temp 97.3  F (36.3  C) (Temporal)   Ht 1.803 m (5' 11\")   " Wt 90.7 kg (200 lb)   SpO2 98%   BMI 27.89 kg/m    BMI: Body mass index is 27.89 kg/m .    General - The patient is well nourished and well developed, and appears to have good nutritional status.  Alert and oriented to person and place, answers questions and cooperates with examination appropriately.    SKIN - No suspicious lesions or rashes.  Respiration - No respiratory distress.  Head and Face - Normocephalic and atraumatic, with no gross asymmetry noted of the contour of the facial features.  The facial nerve is intact, with strong symmetric movements.    Voice and Breathing - The patient was breathing comfortably without the use of accessory muscles. The patients voice was clear and strong, and had appropriate pitch and quality.    Ears - Bilateral pinna and EACs with normal appearing overlying skin. Tympanic membrane intact with good mobility on pneumatic otoscopy bilaterally. Bony landmarks of the ossicular chain are normal. The tympanic membranes are normal in appearance. No retraction, perforation, or masses.  No fluid or purulence was seen in the external canal or the middle ear.     Eyes - Extraocular movements intact.  Sclera were not icteric or injected, conjunctiva were pink and moist.    Mouth - Examination of the oral cavity showed pink, healthy oral mucosa. No lesions or ulcerations noted.  The tongue was mobile and midline, and the dentition were in good condition.      Throat - The walls of the oropharynx were smooth, pink, moist, symmetric, and had no lesions or ulcerations.  The tonsillar pillars and soft palate were symmetric.  The uvula was midline on elevation.    Neck - Normal midline excursion of the laryngotracheal complex during swallowing.  Full range of motion on passive movement.  Palpation of the occipital, submental, submandibular, internal jugular chain, and supraclavicular nodes did not demonstrate any abnormal lymph nodes or masses.  The carotid pulse was palpable bilaterally.   Palpation of the thyroid was soft and smooth, with no nodules or goiter appreciated.  The trachea was mobile and midline.    Nose - External contour is slightly asymmetric, no gross deflection or scars but certainly less prominent contour of the upper lateral cartilage on the left versus the right.  Nasal mucosa is pink and moist with no abnormal mucus.  The septum was deviated to the right and somewhat obstructive, turbinates of slightly enlarged size and position.  No polyps, masses, or purulence noted on examination.  Clinch maneuver significant improved breathing on both sides.  There was definitely internal nasal valve collapse bilaterally more prominent in the left than the right side.  This is likely due to insufficient to support from the upper lateral cartilage.    Neuro - Nonfocal neuro exam is normal, CN 2 through 12 intact, normal gait and muscle tone.            A/P - Eric Wilson is a 54 year old male Patient presents with:  Follow Up: Nasal congestion, deviated septum    Patient with nasal deformity due to poor nasal valve support internally especially on the left side as well as deviated septum and still slightly enlarged turbinates.  He initially benefited from turbinate reduction but symptoms are coming back.  We discussed different options at this point and he wants to consider septoplasty submucous resection of turbinate revision as well as bilateral internal nasal valve repair with batten grafts.We discuss risks and possible complication of septoplasty including septal perforation, bleeding(that may require packing), infection, loss of smell, stenosis, CSF rhinorrhea.  He also understands the cosmetic changes he may experience with batten grafts.  With this knowledge the patient wishes to consider  the surgery.  He will get back in touch with us regarding his decision.    Merritt Yusuf MD           Again, thank you for allowing me to participate in the care of your patient.         Sincerely,        Merritt Yusuf MD, MD    Electronically signed

## 2025-02-22 ENCOUNTER — HEALTH MAINTENANCE LETTER (OUTPATIENT)
Age: 55
End: 2025-02-22